# Patient Record
Sex: FEMALE | Race: BLACK OR AFRICAN AMERICAN
[De-identification: names, ages, dates, MRNs, and addresses within clinical notes are randomized per-mention and may not be internally consistent; named-entity substitution may affect disease eponyms.]

---

## 2021-07-13 ENCOUNTER — TRANSCRIPTION ENCOUNTER (OUTPATIENT)
Age: 31
End: 2021-07-13

## 2021-09-11 ENCOUNTER — TRANSCRIPTION ENCOUNTER (OUTPATIENT)
Age: 31
End: 2021-09-11

## 2023-05-23 PROBLEM — Z00.00 ENCOUNTER FOR PREVENTIVE HEALTH EXAMINATION: Status: ACTIVE | Noted: 2023-05-23

## 2023-11-27 ENCOUNTER — NON-APPOINTMENT (OUTPATIENT)
Age: 33
End: 2023-11-27

## 2024-01-03 ENCOUNTER — NON-APPOINTMENT (OUTPATIENT)
Age: 34
End: 2024-01-03

## 2024-04-23 ENCOUNTER — NON-APPOINTMENT (OUTPATIENT)
Age: 34
End: 2024-04-23

## 2024-06-21 ENCOUNTER — RESULT REVIEW (OUTPATIENT)
Age: 34
End: 2024-06-21

## 2024-06-21 ENCOUNTER — APPOINTMENT (OUTPATIENT)
Dept: MATERNAL FETAL MEDICINE | Facility: HOSPITAL | Age: 34
End: 2024-06-21
Payer: MEDICAID

## 2024-06-21 ENCOUNTER — ASOB RESULT (OUTPATIENT)
Age: 34
End: 2024-06-21

## 2024-06-21 ENCOUNTER — APPOINTMENT (OUTPATIENT)
Dept: OBGYN | Facility: HOSPITAL | Age: 34
End: 2024-06-21
Payer: MEDICAID

## 2024-06-21 ENCOUNTER — OUTPATIENT (OUTPATIENT)
Dept: OUTPATIENT SERVICES | Facility: HOSPITAL | Age: 34
LOS: 1 days | End: 2024-06-21

## 2024-06-21 VITALS
DIASTOLIC BLOOD PRESSURE: 78 MMHG | TEMPERATURE: 97.8 F | HEART RATE: 82 BPM | BODY MASS INDEX: 19.49 KG/M2 | HEIGHT: 63 IN | SYSTOLIC BLOOD PRESSURE: 128 MMHG | WEIGHT: 110 LBS

## 2024-06-21 DIAGNOSIS — Z34.82 ENCOUNTER FOR SUPERVISION OF OTHER NORMAL PREGNANCY, SECOND TRIMESTER: ICD-10-CM

## 2024-06-21 DIAGNOSIS — Z78.9 OTHER SPECIFIED HEALTH STATUS: ICD-10-CM

## 2024-06-21 DIAGNOSIS — Z82.49 FAMILY HISTORY OF ISCHEMIC HEART DISEASE AND OTHER DISEASES OF THE CIRCULATORY SYSTEM: ICD-10-CM

## 2024-06-21 DIAGNOSIS — Z80.1 FAMILY HISTORY OF MALIGNANT NEOPLASM OF TRACHEA, BRONCHUS AND LUNG: ICD-10-CM

## 2024-06-21 LAB
24R-OH-CALCIDIOL SERPL-MCNC: 35.9 NG/ML — SIGNIFICANT CHANGE UP (ref 30–80)
ALBUMIN SERPL ELPH-MCNC: 4.1 G/DL — SIGNIFICANT CHANGE UP (ref 3.3–5)
ALP SERPL-CCNC: 64 U/L — SIGNIFICANT CHANGE UP (ref 40–120)
ALT FLD-CCNC: 12 U/L — SIGNIFICANT CHANGE UP (ref 4–33)
ANION GAP SERPL CALC-SCNC: 12 MMOL/L — SIGNIFICANT CHANGE UP (ref 7–14)
APPEARANCE UR: CLEAR — SIGNIFICANT CHANGE UP
AST SERPL-CCNC: 20 U/L — SIGNIFICANT CHANGE UP (ref 4–32)
BACTERIA # UR AUTO: ABNORMAL /HPF
BILIRUB SERPL-MCNC: 0.7 MG/DL — SIGNIFICANT CHANGE UP (ref 0.2–1.2)
BILIRUB UR-MCNC: NEGATIVE — SIGNIFICANT CHANGE UP
BUN SERPL-MCNC: 6 MG/DL — LOW (ref 7–23)
CALCIUM SERPL-MCNC: 9.3 MG/DL — SIGNIFICANT CHANGE UP (ref 8.4–10.5)
CAST: 0 /LPF — SIGNIFICANT CHANGE UP (ref 0–4)
CHLORIDE SERPL-SCNC: 100 MMOL/L — SIGNIFICANT CHANGE UP (ref 98–107)
CO2 SERPL-SCNC: 23 MMOL/L — SIGNIFICANT CHANGE UP (ref 22–31)
COLOR SPEC: YELLOW — SIGNIFICANT CHANGE UP
CREAT SERPL-MCNC: 0.59 MG/DL — SIGNIFICANT CHANGE UP (ref 0.5–1.3)
DIFF PNL FLD: NEGATIVE — SIGNIFICANT CHANGE UP
EGFR: 123 ML/MIN/1.73M2 — SIGNIFICANT CHANGE UP
GLUCOSE SERPL-MCNC: 67 MG/DL — LOW (ref 70–99)
GLUCOSE UR QL: NEGATIVE MG/DL — SIGNIFICANT CHANGE UP
KETONES UR-MCNC: NEGATIVE MG/DL — SIGNIFICANT CHANGE UP
LEUKOCYTE ESTERASE UR-ACNC: ABNORMAL
NITRITE UR-MCNC: NEGATIVE — SIGNIFICANT CHANGE UP
PH UR: 6.5 — SIGNIFICANT CHANGE UP (ref 5–8)
POTASSIUM SERPL-MCNC: 4 MMOL/L — SIGNIFICANT CHANGE UP (ref 3.5–5.3)
POTASSIUM SERPL-SCNC: 4 MMOL/L — SIGNIFICANT CHANGE UP (ref 3.5–5.3)
PROT SERPL-MCNC: 7.3 G/DL — SIGNIFICANT CHANGE UP (ref 6–8.3)
PROT UR-MCNC: NEGATIVE MG/DL — SIGNIFICANT CHANGE UP
RBC CASTS # UR COMP ASSIST: 0 /HPF — SIGNIFICANT CHANGE UP (ref 0–4)
SODIUM SERPL-SCNC: 135 MMOL/L — SIGNIFICANT CHANGE UP (ref 135–145)
SP GR SPEC: 1.01 — SIGNIFICANT CHANGE UP (ref 1–1.03)
SQUAMOUS # UR AUTO: 10 /HPF — HIGH (ref 0–5)
URATE SERPL-MCNC: 2.7 MG/DL — SIGNIFICANT CHANGE UP (ref 2.5–7)
UROBILINOGEN FLD QL: 0.2 MG/DL — SIGNIFICANT CHANGE UP (ref 0.2–1)
WBC UR QL: 7 /HPF — HIGH (ref 0–5)

## 2024-06-21 PROCEDURE — 99385 PREV VISIT NEW AGE 18-39: CPT

## 2024-06-21 PROCEDURE — 76815 OB US LIMITED FETUS(S): CPT | Mod: 26

## 2024-06-21 RX ORDER — PRENATAL VIT 27,CALC/IRON/FA 60 MG-1 MG
60-1 TABLET ORAL DAILY
Qty: 100 | Refills: 2 | Status: ACTIVE | OUTPATIENT
Start: 2024-06-21 | End: 2025-04-17

## 2024-06-21 RX ORDER — ASPIRIN 81 MG/1
81 TABLET, COATED ORAL
Qty: 60 | Refills: 4 | Status: ACTIVE | COMMUNITY
Start: 2024-06-21 | End: 1900-01-01

## 2024-06-22 LAB
CULTURE RESULTS: SIGNIFICANT CHANGE UP
MEV IGG SER-ACNC: 135 AU/ML — SIGNIFICANT CHANGE UP
MEV IGG+IGM SER-IMP: POSITIVE — SIGNIFICANT CHANGE UP
SPECIMEN SOURCE: SIGNIFICANT CHANGE UP

## 2024-06-23 LAB
GAMMA INTERFERON BACKGROUND BLD IA-ACNC: 0.02 IU/ML — SIGNIFICANT CHANGE UP
M TB IFN-G BLD-IMP: NEGATIVE — SIGNIFICANT CHANGE UP
M TB IFN-G CD4+ BCKGRND COR BLD-ACNC: 0 IU/ML — SIGNIFICANT CHANGE UP
M TB IFN-G CD4+CD8+ BCKGRND COR BLD-ACNC: 0 IU/ML — SIGNIFICANT CHANGE UP
QUANT TB PLUS MITOGEN MINUS NIL: 0.88 IU/ML — SIGNIFICANT CHANGE UP

## 2024-06-24 DIAGNOSIS — Z34.82 ENCOUNTER FOR SUPERVISION OF OTHER NORMAL PREGNANCY, SECOND TRIMESTER: ICD-10-CM

## 2024-06-24 LAB
AF-AFP MOM: 1.47 — SIGNIFICANT CHANGE UP
AFP CONCENTRATION: 66.35 NG/ML — SIGNIFICANT CHANGE UP
AFP INTERPRETATION: SIGNIFICANT CHANGE UP
AFP MOM CUT-OFF: 2.8 — SIGNIFICANT CHANGE UP
AFP PERCENTILE: 88.3 — SIGNIFICANT CHANGE UP
AFP SCREENING RESULT: SIGNIFICANT CHANGE UP
AFTER SCREENING RISK OPEN SPINA BIFIDA: SIGNIFICANT CHANGE UP
BEFORE SCREENING RISK OPEN SPINA BIFIDA: SIGNIFICANT CHANGE UP
EXTREME ANALYTE ALERT: NO — SIGNIFICANT CHANGE UP
GEN TEST INFORMATION: SIGNIFICANT CHANGE UP
MS GESTATIONAL AGE: SIGNIFICANT CHANGE UP
ORDER ENTRY INFORMATION: SIGNIFICANT CHANGE UP

## 2024-06-27 ENCOUNTER — NON-APPOINTMENT (OUTPATIENT)
Age: 34
End: 2024-06-27

## 2024-07-05 ENCOUNTER — TRANSCRIPTION ENCOUNTER (OUTPATIENT)
Age: 34
End: 2024-07-05

## 2024-07-09 ENCOUNTER — NON-APPOINTMENT (OUTPATIENT)
Age: 34
End: 2024-07-09

## 2024-07-11 ENCOUNTER — ASOB RESULT (OUTPATIENT)
Age: 34
End: 2024-07-11

## 2024-07-11 ENCOUNTER — APPOINTMENT (OUTPATIENT)
Dept: OBGYN | Facility: HOSPITAL | Age: 34
End: 2024-07-11
Payer: MEDICAID

## 2024-07-11 ENCOUNTER — OUTPATIENT (OUTPATIENT)
Dept: OUTPATIENT SERVICES | Facility: HOSPITAL | Age: 34
LOS: 1 days | End: 2024-07-11

## 2024-07-11 ENCOUNTER — APPOINTMENT (OUTPATIENT)
Dept: MATERNAL FETAL MEDICINE | Facility: HOSPITAL | Age: 34
End: 2024-07-11
Payer: MEDICAID

## 2024-07-11 VITALS
HEART RATE: 86 BPM | BODY MASS INDEX: 21.97 KG/M2 | TEMPERATURE: 98 F | SYSTOLIC BLOOD PRESSURE: 125 MMHG | HEIGHT: 63 IN | WEIGHT: 124 LBS | DIASTOLIC BLOOD PRESSURE: 68 MMHG

## 2024-07-11 DIAGNOSIS — Z34.82 ENCOUNTER FOR SUPERVISION OF OTHER NORMAL PREGNANCY, SECOND TRIMESTER: ICD-10-CM

## 2024-07-11 PROCEDURE — 76811 OB US DETAILED SNGL FETUS: CPT | Mod: 26

## 2024-07-11 PROCEDURE — 99213 OFFICE O/P EST LOW 20 MIN: CPT

## 2024-07-12 DIAGNOSIS — Z34.82 ENCOUNTER FOR SUPERVISION OF OTHER NORMAL PREGNANCY, SECOND TRIMESTER: ICD-10-CM

## 2024-07-31 ENCOUNTER — NON-APPOINTMENT (OUTPATIENT)
Age: 34
End: 2024-07-31

## 2024-08-07 ENCOUNTER — NON-APPOINTMENT (OUTPATIENT)
Age: 34
End: 2024-08-07

## 2024-08-08 ENCOUNTER — OUTPATIENT (OUTPATIENT)
Dept: OUTPATIENT SERVICES | Facility: HOSPITAL | Age: 34
LOS: 1 days | End: 2024-08-08

## 2024-08-08 ENCOUNTER — APPOINTMENT (OUTPATIENT)
Dept: OBGYN | Facility: HOSPITAL | Age: 34
End: 2024-08-08

## 2024-08-08 PROCEDURE — 99213 OFFICE O/P EST LOW 20 MIN: CPT

## 2024-08-12 DIAGNOSIS — Z34.82 ENCOUNTER FOR SUPERVISION OF OTHER NORMAL PREGNANCY, SECOND TRIMESTER: ICD-10-CM

## 2024-08-14 ENCOUNTER — APPOINTMENT (OUTPATIENT)
Dept: MATERNAL FETAL MEDICINE | Facility: CLINIC | Age: 34
End: 2024-08-14
Payer: MEDICAID

## 2024-08-14 ENCOUNTER — ASOB RESULT (OUTPATIENT)
Age: 34
End: 2024-08-14

## 2024-08-14 PROCEDURE — 99204 OFFICE O/P NEW MOD 45 MIN: CPT | Mod: 95

## 2024-09-09 ENCOUNTER — NON-APPOINTMENT (OUTPATIENT)
Age: 34
End: 2024-09-09

## 2024-09-10 ENCOUNTER — NON-APPOINTMENT (OUTPATIENT)
Age: 34
End: 2024-09-10

## 2024-09-10 ENCOUNTER — RESULT REVIEW (OUTPATIENT)
Age: 34
End: 2024-09-10

## 2024-09-10 ENCOUNTER — APPOINTMENT (OUTPATIENT)
Dept: OBGYN | Facility: HOSPITAL | Age: 34
End: 2024-09-10
Payer: MEDICAID

## 2024-09-10 ENCOUNTER — OUTPATIENT (OUTPATIENT)
Dept: OUTPATIENT SERVICES | Facility: HOSPITAL | Age: 34
LOS: 1 days | End: 2024-09-10

## 2024-09-10 VITALS
BODY MASS INDEX: 25.16 KG/M2 | TEMPERATURE: 98 F | SYSTOLIC BLOOD PRESSURE: 125 MMHG | HEIGHT: 63 IN | WEIGHT: 142 LBS | DIASTOLIC BLOOD PRESSURE: 80 MMHG | HEART RATE: 78 BPM

## 2024-09-10 DIAGNOSIS — Z34.82 ENCOUNTER FOR SUPERVISION OF OTHER NORMAL PREGNANCY, SECOND TRIMESTER: ICD-10-CM

## 2024-09-10 DIAGNOSIS — Z34.93 ENCOUNTER FOR SUPERVISION OF NORMAL PREGNANCY, UNSPECIFIED, THIRD TRIMESTER: ICD-10-CM

## 2024-09-10 LAB
ANISOCYTOSIS BLD QL: SLIGHT — SIGNIFICANT CHANGE UP
BASOPHILS # BLD AUTO: 0 K/UL — SIGNIFICANT CHANGE UP (ref 0–0.2)
BASOPHILS NFR BLD AUTO: 0 % — SIGNIFICANT CHANGE UP (ref 0–2)
EOSINOPHIL # BLD AUTO: 0.24 K/UL — SIGNIFICANT CHANGE UP (ref 0–0.5)
EOSINOPHIL NFR BLD AUTO: 2.6 % — SIGNIFICANT CHANGE UP (ref 0–6)
GIANT PLATELETS BLD QL SMEAR: PRESENT — SIGNIFICANT CHANGE UP
GLUCOSE 1H P MEAL SERPL-MCNC: 93 MG/DL — SIGNIFICANT CHANGE UP (ref 70–134)
HCT VFR BLD CALC: 33.3 % — LOW (ref 34.5–45)
HGB BLD-MCNC: 11.1 G/DL — LOW (ref 11.5–15.5)
HIV 1+2 AB+HIV1 P24 AG SERPL QL IA: SIGNIFICANT CHANGE UP
HYPOCHROMIA BLD QL: SLIGHT — SIGNIFICANT CHANGE UP
IANC: 6.02 K/UL — SIGNIFICANT CHANGE UP (ref 1.8–7.4)
LYMPHOCYTES # BLD AUTO: 1.27 K/UL — SIGNIFICANT CHANGE UP (ref 1–3.3)
LYMPHOCYTES # BLD AUTO: 13.8 % — SIGNIFICANT CHANGE UP (ref 13–44)
MCHC RBC-ENTMCNC: 28.8 PG — SIGNIFICANT CHANGE UP (ref 27–34)
MCHC RBC-ENTMCNC: 33.3 GM/DL — SIGNIFICANT CHANGE UP (ref 32–36)
MCV RBC AUTO: 86.3 FL — SIGNIFICANT CHANGE UP (ref 80–100)
METAMYELOCYTES # FLD: 0.9 % — SIGNIFICANT CHANGE UP (ref 0–1)
MONOCYTES # BLD AUTO: 0.48 K/UL — SIGNIFICANT CHANGE UP (ref 0–0.9)
MONOCYTES NFR BLD AUTO: 5.2 % — SIGNIFICANT CHANGE UP (ref 2–14)
MYELOCYTES NFR BLD: 1.7 % — HIGH (ref 0–0)
NEUTROPHILS # BLD AUTO: 6.82 K/UL — SIGNIFICANT CHANGE UP (ref 1.8–7.4)
NEUTROPHILS NFR BLD AUTO: 73.3 % — SIGNIFICANT CHANGE UP (ref 43–77)
NEUTS BAND # BLD: 0.8 % — SIGNIFICANT CHANGE UP (ref 0–6)
OVALOCYTES BLD QL SMEAR: SLIGHT — SIGNIFICANT CHANGE UP
PLAT MORPH BLD: NORMAL — SIGNIFICANT CHANGE UP
PLATELET # BLD AUTO: 253 K/UL — SIGNIFICANT CHANGE UP (ref 150–400)
PLATELET COUNT - ESTIMATE: NORMAL — SIGNIFICANT CHANGE UP
POIKILOCYTOSIS BLD QL AUTO: SLIGHT — SIGNIFICANT CHANGE UP
POLYCHROMASIA BLD QL SMEAR: SLIGHT — SIGNIFICANT CHANGE UP
RBC # BLD: 3.86 M/UL — SIGNIFICANT CHANGE UP (ref 3.8–5.2)
RBC # FLD: 12.9 % — SIGNIFICANT CHANGE UP (ref 10.3–14.5)
RBC BLD AUTO: ABNORMAL
VARIANT LYMPHS # BLD: 1.7 % — SIGNIFICANT CHANGE UP (ref 0–6)
WBC # BLD: 9.2 K/UL — SIGNIFICANT CHANGE UP (ref 3.8–10.5)
WBC # FLD AUTO: 9.2 K/UL — SIGNIFICANT CHANGE UP (ref 3.8–10.5)

## 2024-09-10 PROCEDURE — 99213 OFFICE O/P EST LOW 20 MIN: CPT

## 2024-09-10 RX ORDER — ASPIRIN 81 MG/1
81 TABLET, COATED ORAL DAILY
Qty: 90 | Refills: 3 | Status: DISCONTINUED | COMMUNITY
Start: 2024-09-10 | End: 2024-09-10

## 2024-09-10 RX ORDER — ASPIRIN 81 MG/1
81 TABLET, COATED ORAL DAILY
Qty: 60 | Refills: 2 | Status: ACTIVE | COMMUNITY
Start: 2024-09-10 | End: 1900-01-01

## 2024-09-11 DIAGNOSIS — Z34.93 ENCOUNTER FOR SUPERVISION OF NORMAL PREGNANCY, UNSPECIFIED, THIRD TRIMESTER: ICD-10-CM

## 2024-09-11 DIAGNOSIS — D56.3 THALASSEMIA MINOR: ICD-10-CM

## 2024-09-11 LAB
24R-OH-CALCIDIOL SERPL-MCNC: 29.3 NG/ML — LOW (ref 30–80)
T PALLIDUM AB TITR SER: NEGATIVE — SIGNIFICANT CHANGE UP

## 2024-09-12 LAB — LEAD BLD-MCNC: <1 UG/DL — SIGNIFICANT CHANGE UP (ref 0–3.4)

## 2024-09-23 ENCOUNTER — NON-APPOINTMENT (OUTPATIENT)
Age: 34
End: 2024-09-23

## 2024-09-24 ENCOUNTER — ASOB RESULT (OUTPATIENT)
Age: 34
End: 2024-09-24

## 2024-09-24 ENCOUNTER — APPOINTMENT (OUTPATIENT)
Dept: ANTEPARTUM | Facility: CLINIC | Age: 34
End: 2024-09-24
Payer: MEDICAID

## 2024-09-24 ENCOUNTER — APPOINTMENT (OUTPATIENT)
Dept: OBGYN | Facility: HOSPITAL | Age: 34
End: 2024-09-24
Payer: MEDICAID

## 2024-09-24 ENCOUNTER — OUTPATIENT (OUTPATIENT)
Dept: OUTPATIENT SERVICES | Facility: HOSPITAL | Age: 34
LOS: 1 days | End: 2024-09-24

## 2024-09-24 VITALS
WEIGHT: 146 LBS | HEIGHT: 63 IN | TEMPERATURE: 97.6 F | DIASTOLIC BLOOD PRESSURE: 76 MMHG | BODY MASS INDEX: 25.87 KG/M2 | SYSTOLIC BLOOD PRESSURE: 110 MMHG | HEART RATE: 77 BPM

## 2024-09-24 VITALS
HEART RATE: 77 BPM | SYSTOLIC BLOOD PRESSURE: 127 MMHG | DIASTOLIC BLOOD PRESSURE: 90 MMHG | BODY MASS INDEX: 25.87 KG/M2 | TEMPERATURE: 97.6 F | WEIGHT: 146 LBS | HEIGHT: 63 IN

## 2024-09-24 DIAGNOSIS — Z34.93 ENCOUNTER FOR SUPERVISION OF NORMAL PREGNANCY, UNSPECIFIED, THIRD TRIMESTER: ICD-10-CM

## 2024-09-24 DIAGNOSIS — E55.9 VITAMIN D DEFICIENCY, UNSPECIFIED: ICD-10-CM

## 2024-09-24 PROCEDURE — 76816 OB US FOLLOW-UP PER FETUS: CPT

## 2024-09-24 PROCEDURE — 99213 OFFICE O/P EST LOW 20 MIN: CPT

## 2024-09-24 PROCEDURE — 76819 FETAL BIOPHYS PROFIL W/O NST: CPT

## 2024-09-24 RX ORDER — CHROMIUM 200 MCG
25 MCG TABLET ORAL DAILY
Qty: 90 | Refills: 1 | Status: ACTIVE | COMMUNITY
Start: 2024-09-24 | End: 1900-01-01

## 2024-09-25 DIAGNOSIS — E55.9 VITAMIN D DEFICIENCY, UNSPECIFIED: ICD-10-CM

## 2024-10-07 ENCOUNTER — NON-APPOINTMENT (OUTPATIENT)
Age: 34
End: 2024-10-07

## 2024-10-08 ENCOUNTER — OUTPATIENT (OUTPATIENT)
Dept: OUTPATIENT SERVICES | Facility: HOSPITAL | Age: 34
LOS: 1 days | End: 2024-10-08

## 2024-10-08 ENCOUNTER — APPOINTMENT (OUTPATIENT)
Dept: OBGYN | Facility: HOSPITAL | Age: 34
End: 2024-10-08
Payer: MEDICAID

## 2024-10-08 VITALS
TEMPERATURE: 97.8 F | WEIGHT: 152 LBS | SYSTOLIC BLOOD PRESSURE: 127 MMHG | DIASTOLIC BLOOD PRESSURE: 85 MMHG | HEART RATE: 82 BPM | HEIGHT: 63 IN | BODY MASS INDEX: 26.93 KG/M2

## 2024-10-08 DIAGNOSIS — Z34.93 ENCOUNTER FOR SUPERVISION OF NORMAL PREGNANCY, UNSPECIFIED, THIRD TRIMESTER: ICD-10-CM

## 2024-10-08 PROCEDURE — 99213 OFFICE O/P EST LOW 20 MIN: CPT

## 2024-10-10 DIAGNOSIS — Z34.93 ENCOUNTER FOR SUPERVISION OF NORMAL PREGNANCY, UNSPECIFIED, THIRD TRIMESTER: ICD-10-CM

## 2024-10-22 ENCOUNTER — APPOINTMENT (OUTPATIENT)
Dept: ANTEPARTUM | Facility: CLINIC | Age: 34
End: 2024-10-22

## 2024-10-24 ENCOUNTER — APPOINTMENT (OUTPATIENT)
Dept: OBGYN | Facility: HOSPITAL | Age: 34
End: 2024-10-24
Payer: MEDICAID

## 2024-10-24 ENCOUNTER — APPOINTMENT (OUTPATIENT)
Dept: MATERNAL FETAL MEDICINE | Facility: HOSPITAL | Age: 34
End: 2024-10-24
Payer: MEDICAID

## 2024-10-24 ENCOUNTER — ASOB RESULT (OUTPATIENT)
Age: 34
End: 2024-10-24

## 2024-10-24 ENCOUNTER — OUTPATIENT (OUTPATIENT)
Dept: OUTPATIENT SERVICES | Facility: HOSPITAL | Age: 34
LOS: 1 days | End: 2024-10-24

## 2024-10-24 VITALS
BODY MASS INDEX: 28.53 KG/M2 | DIASTOLIC BLOOD PRESSURE: 82 MMHG | TEMPERATURE: 98.1 F | SYSTOLIC BLOOD PRESSURE: 128 MMHG | HEART RATE: 90 BPM | HEIGHT: 63 IN | WEIGHT: 161 LBS

## 2024-10-24 DIAGNOSIS — M25.511 PAIN IN RIGHT SHOULDER: ICD-10-CM

## 2024-10-24 DIAGNOSIS — Z34.93 ENCOUNTER FOR SUPERVISION OF NORMAL PREGNANCY, UNSPECIFIED, THIRD TRIMESTER: ICD-10-CM

## 2024-10-24 PROCEDURE — 99213 OFFICE O/P EST LOW 20 MIN: CPT

## 2024-10-24 PROCEDURE — 76816 OB US FOLLOW-UP PER FETUS: CPT | Mod: 26

## 2024-10-24 PROCEDURE — 76819 FETAL BIOPHYS PROFIL W/O NST: CPT | Mod: 26,59

## 2024-10-25 DIAGNOSIS — Z34.93 ENCOUNTER FOR SUPERVISION OF NORMAL PREGNANCY, UNSPECIFIED, THIRD TRIMESTER: ICD-10-CM

## 2024-10-30 ENCOUNTER — ASOB RESULT (OUTPATIENT)
Age: 34
End: 2024-10-30

## 2024-10-30 ENCOUNTER — APPOINTMENT (OUTPATIENT)
Dept: ANTEPARTUM | Facility: CLINIC | Age: 34
End: 2024-10-30

## 2024-10-30 ENCOUNTER — INPATIENT (INPATIENT)
Facility: HOSPITAL | Age: 34
LOS: 3 days | Discharge: HOME | End: 2024-11-03
Attending: SPECIALIST | Admitting: SPECIALIST
Payer: MEDICAID

## 2024-10-30 ENCOUNTER — NON-APPOINTMENT (OUTPATIENT)
Age: 34
End: 2024-10-30

## 2024-10-30 VITALS
TEMPERATURE: 98 F | RESPIRATION RATE: 16 BRPM | DIASTOLIC BLOOD PRESSURE: 92 MMHG | SYSTOLIC BLOOD PRESSURE: 148 MMHG | HEART RATE: 83 BPM

## 2024-10-30 DIAGNOSIS — O14.93 UNSPECIFIED PRE-ECLAMPSIA, THIRD TRIMESTER: ICD-10-CM

## 2024-10-30 DIAGNOSIS — O26.899 OTHER SPECIFIED PREGNANCY RELATED CONDITIONS, UNSPECIFIED TRIMESTER: ICD-10-CM

## 2024-10-30 LAB
ALBUMIN SERPL ELPH-MCNC: 3.4 G/DL — SIGNIFICANT CHANGE UP (ref 3.3–5)
ALP SERPL-CCNC: 121 U/L — HIGH (ref 40–120)
ALT FLD-CCNC: 19 U/L — SIGNIFICANT CHANGE UP (ref 4–33)
ANION GAP SERPL CALC-SCNC: 9 MMOL/L — SIGNIFICANT CHANGE UP (ref 7–14)
APPEARANCE UR: ABNORMAL
AST SERPL-CCNC: 23 U/L — SIGNIFICANT CHANGE UP (ref 4–32)
BACTERIA # UR AUTO: NEGATIVE /HPF — SIGNIFICANT CHANGE UP
BASOPHILS # BLD AUTO: 0.09 K/UL — SIGNIFICANT CHANGE UP (ref 0–0.2)
BASOPHILS NFR BLD AUTO: 1 % — SIGNIFICANT CHANGE UP (ref 0–2)
BILIRUB SERPL-MCNC: 0.4 MG/DL — SIGNIFICANT CHANGE UP (ref 0.2–1.2)
BILIRUB UR-MCNC: NEGATIVE — SIGNIFICANT CHANGE UP
BUN SERPL-MCNC: 4 MG/DL — LOW (ref 7–23)
CALCIUM SERPL-MCNC: 8.8 MG/DL — SIGNIFICANT CHANGE UP (ref 8.4–10.5)
CAST: 0 /LPF — SIGNIFICANT CHANGE UP (ref 0–4)
CHLORIDE SERPL-SCNC: 106 MMOL/L — SIGNIFICANT CHANGE UP (ref 98–107)
CO2 SERPL-SCNC: 22 MMOL/L — SIGNIFICANT CHANGE UP (ref 22–31)
COLOR SPEC: YELLOW — SIGNIFICANT CHANGE UP
CREAT ?TM UR-MCNC: 70 MG/DL — SIGNIFICANT CHANGE UP
CREAT SERPL-MCNC: 0.67 MG/DL — SIGNIFICANT CHANGE UP (ref 0.5–1.3)
DIFF PNL FLD: NEGATIVE — SIGNIFICANT CHANGE UP
EGFR: 120 ML/MIN/1.73M2 — SIGNIFICANT CHANGE UP
EOSINOPHIL # BLD AUTO: 0.2 K/UL — SIGNIFICANT CHANGE UP (ref 0–0.5)
EOSINOPHIL NFR BLD AUTO: 2.3 % — SIGNIFICANT CHANGE UP (ref 0–6)
GLUCOSE SERPL-MCNC: 79 MG/DL — SIGNIFICANT CHANGE UP (ref 70–99)
GLUCOSE UR QL: NEGATIVE MG/DL — SIGNIFICANT CHANGE UP
HCT VFR BLD CALC: 35.9 % — SIGNIFICANT CHANGE UP (ref 34.5–45)
HGB BLD-MCNC: 11.8 G/DL — SIGNIFICANT CHANGE UP (ref 11.5–15.5)
IANC: 5.63 K/UL — SIGNIFICANT CHANGE UP (ref 1.8–7.4)
IMM GRANULOCYTES NFR BLD AUTO: 4.1 % — HIGH (ref 0–0.9)
KETONES UR-MCNC: NEGATIVE MG/DL — SIGNIFICANT CHANGE UP
LDH SERPL L TO P-CCNC: 229 U/L — HIGH (ref 135–225)
LEUKOCYTE ESTERASE UR-ACNC: ABNORMAL
LYMPHOCYTES # BLD AUTO: 1.73 K/UL — SIGNIFICANT CHANGE UP (ref 1–3.3)
LYMPHOCYTES # BLD AUTO: 19.6 % — SIGNIFICANT CHANGE UP (ref 13–44)
MCHC RBC-ENTMCNC: 28.5 PG — SIGNIFICANT CHANGE UP (ref 27–34)
MCHC RBC-ENTMCNC: 32.9 G/DL — SIGNIFICANT CHANGE UP (ref 32–36)
MCV RBC AUTO: 86.7 FL — SIGNIFICANT CHANGE UP (ref 80–100)
MONOCYTES # BLD AUTO: 0.8 K/UL — SIGNIFICANT CHANGE UP (ref 0–0.9)
MONOCYTES NFR BLD AUTO: 9.1 % — SIGNIFICANT CHANGE UP (ref 2–14)
NEUTROPHILS # BLD AUTO: 5.63 K/UL — SIGNIFICANT CHANGE UP (ref 1.8–7.4)
NEUTROPHILS NFR BLD AUTO: 63.9 % — SIGNIFICANT CHANGE UP (ref 43–77)
NITRITE UR-MCNC: NEGATIVE — SIGNIFICANT CHANGE UP
NRBC # BLD: 0 /100 WBCS — SIGNIFICANT CHANGE UP (ref 0–0)
NRBC # FLD: 0.04 K/UL — HIGH (ref 0–0)
PH UR: 6.5 — SIGNIFICANT CHANGE UP (ref 5–8)
PLATELET # BLD AUTO: 225 K/UL — SIGNIFICANT CHANGE UP (ref 150–400)
POTASSIUM SERPL-MCNC: 4 MMOL/L — SIGNIFICANT CHANGE UP (ref 3.5–5.3)
POTASSIUM SERPL-SCNC: 4 MMOL/L — SIGNIFICANT CHANGE UP (ref 3.5–5.3)
PROT ?TM UR-MCNC: 26 MG/DL — SIGNIFICANT CHANGE UP
PROT SERPL-MCNC: 6.5 G/DL — SIGNIFICANT CHANGE UP (ref 6–8.3)
PROT UR-MCNC: 30 MG/DL
PROT/CREAT UR-RTO: 0.4 RATIO — HIGH (ref 0–0.2)
RBC # BLD: 4.14 M/UL — SIGNIFICANT CHANGE UP (ref 3.8–5.2)
RBC # FLD: 13.9 % — SIGNIFICANT CHANGE UP (ref 10.3–14.5)
RBC CASTS # UR COMP ASSIST: 0 /HPF — SIGNIFICANT CHANGE UP (ref 0–4)
REVIEW: SIGNIFICANT CHANGE UP
SODIUM SERPL-SCNC: 137 MMOL/L — SIGNIFICANT CHANGE UP (ref 135–145)
SP GR SPEC: 1.01 — SIGNIFICANT CHANGE UP (ref 1–1.03)
SQUAMOUS # UR AUTO: 5 /HPF — SIGNIFICANT CHANGE UP (ref 0–5)
URATE SERPL-MCNC: 4.1 MG/DL — SIGNIFICANT CHANGE UP (ref 2.5–7)
UROBILINOGEN FLD QL: 0.2 MG/DL — SIGNIFICANT CHANGE UP (ref 0.2–1)
WBC # BLD: 8.81 K/UL — SIGNIFICANT CHANGE UP (ref 3.8–10.5)
WBC # FLD AUTO: 8.81 K/UL — SIGNIFICANT CHANGE UP (ref 3.8–10.5)
WBC UR QL: 2 /HPF — SIGNIFICANT CHANGE UP (ref 0–5)

## 2024-10-30 PROCEDURE — 76819 FETAL BIOPHYS PROFIL W/O NST: CPT | Mod: 26

## 2024-10-30 RX ORDER — CHLORHEXIDINE GLUCONATE 40 MG/ML
1 SOLUTION TOPICAL DAILY
Refills: 0 | Status: DISCONTINUED | OUTPATIENT
Start: 2024-10-30 | End: 2024-10-30

## 2024-10-30 RX ORDER — SODIUM CHLORIDE 9 MG/ML
3 INJECTION, SOLUTION INTRAMUSCULAR; INTRAVENOUS; SUBCUTANEOUS EVERY 8 HOURS
Refills: 0 | Status: DISCONTINUED | OUTPATIENT
Start: 2024-10-30 | End: 2024-11-03

## 2024-10-30 RX ORDER — OXYTOCIN IN D5W-0.2% SODIUM CL 15/250 ML
PLASTIC BAG, INJECTION (ML) INTRAVENOUS
Qty: 30 | Refills: 0 | Status: DISCONTINUED | OUTPATIENT
Start: 2024-10-30 | End: 2024-10-30

## 2024-10-30 RX ORDER — NIFEDIPINE 90 MG
30 TABLET, EXTENDED RELEASE 24 HR ORAL DAILY
Refills: 0 | Status: DISCONTINUED | OUTPATIENT
Start: 2024-10-30 | End: 2024-10-31

## 2024-10-30 RX ORDER — INFLUENZ VIR VAC TV P-SURF2003 15MCG/.5ML
0.5 SYRINGE (ML) INTRAMUSCULAR ONCE
Refills: 0 | Status: COMPLETED | OUTPATIENT
Start: 2024-10-30 | End: 2024-10-30

## 2024-10-30 RX ADMIN — Medication 30 MILLIGRAM(S): at 20:43

## 2024-10-30 RX ADMIN — SODIUM CHLORIDE 3 MILLILITER(S): 9 INJECTION, SOLUTION INTRAMUSCULAR; INTRAVENOUS; SUBCUTANEOUS at 22:09

## 2024-10-30 NOTE — OB PROVIDER TRIAGE NOTE - NSOBPROVIDERNOTE_OBGYN_ALL_OB_FT
NST in progress  HELLP labs sent   saline lock in place  for bpp  will continue to monitor NST in progress  HEL labs sent   saline lock in place  for bpp  will continue to monitor    ___________________________________________________________________  GISELE Granadoser NP  1900  Received care of patient   [facility] labs pending   Serial BPs     20:00  [facility] labs- WNL  PC 0.4  148-92, 155/100, 143/97, 136/89, 141/90, 137/87, 133/88  Admit for observation, PEC without severe features

## 2024-10-30 NOTE — OB PROVIDER H&P - NSLOWPPHRISK_OBGYN_A_OB
No previous uterine incision/Aly Pregnancy/Less than or equal to 4 previous vaginal births/No known bleeding disorder/No history of postpartum hemorrhage/No other PPH risks indicated

## 2024-10-30 NOTE — OB PROVIDER H&P - PROBLEM SELECTOR PLAN 1
Admit for PEC without severe features, observation   D/W Dr. Sweet   Routine Orders  Observation of BPs  Procardia XL30mg daily  GBS collected  HELLP labs WNL  HELLP labs AM  24 hour urine collection  NST BID  MFM AM    Risks, benefits, alternatives, and possible complications have been discussed in detail with the patient in her native language. Pre-admission, admission, and post admission procedures and expectations were discussed in detail. All questions answered, all appropriate hospital consents were signed. Anticipate normal vaginal delivery.   Informed consent was obtained. The following was discussed:   - Induction/augmentation of labor: use of medication and/or cook balloon to begin or enhance labor   - Obstetrical management including internal fetal/contraction monitoring   - Normal vaginal delivery   - Possible  section

## 2024-10-30 NOTE — OB PROVIDER H&P - ASSESSMENT
NST in progress  [facility] labs sent   saline lock in place  for bpp  will continue to monitor    ___________________________________________________________________  GISELE Granadoser NP  1900  Received care of patient   [facility] labs pending   Serial BPs     20:00  D/W Dr. Kee mckinley- WNL  PC 0.4  148-92, 155/100, 143/97, 136/89, 141/90, 137/87, 133/88  Admit for observation, PEC without severe features   NST in progress  HELLP labs sent   saline lock in place  for bpp  will continue to monitor    ___________________________________________________________________  GISELE Madden NP  1900  Received care of patient   HELLP labs pending   Serial BPs     20:00  D/W Dr. Sweet   HELLP labs- WNL  PC 0.4  148-92, 155/100, 143/97, 136/89, 141/90, 137/87, 133/88  Admit for observation, PEC without severe features    OB Attending On Call  33 year old  EGA 35+ weeks with increased BPs.  P/C 0.4  FHT - reactive  Pt with preeclampsia without severe features.    Will:    1) Admit to L and D  2) 24 urine  3) Serial labs  4) Ofiicial sono  5) MFM consult    Will follow    ZINA Sweet

## 2024-10-30 NOTE — OB PROVIDER TRIAGE NOTE - NSHPPHYSICALEXAM_GEN_ALL_CORE
Vital Signs Last 24 Hrs  T(C): 36.4 (30 Oct 2024 18:49), Max: 36.4 (30 Oct 2024 18:19)  T(F): 97.52 (30 Oct 2024 18:49), Max: 97.52 (30 Oct 2024 18:49)  HR: 82 (30 Oct 2024 18:49) (82 - 83)  BP: 155/100 (30 Oct 2024 18:49) (148/92 - 155/100)  BP(mean): --  RR: 16 (30 Oct 2024 18:49) (16 - 16)  SpO2: --    Assessment reveals VSS  General: Female sitting comfortably in no apparent distress  Neuro: No facial asymmetry, no slurred speech, moves all 4 extremities  Mood: Alert and lucid, appropriate mood and affect  A&Ox3  Lungs- clear bilateral  Heart- normal rate and regular rhythm  Extremities- Warm, Dry, no edema present, good pulses   Abdomen soft, NT, gravid  Cat 1 tracing reactive, no contractions on toco   Transabdominal Ultrasound-                 images saved ASOB  Vaginal Exam-

## 2024-10-30 NOTE — OB PROVIDER H&P - NS_OBGYNHISTORY_OBGYN_ALL_OB_FT
OB: TOPx2  D&Cx1  GYN: Denies      AP course uncomplicated  #PEC without severe features   #HELLP- WNL. PC 0.4

## 2024-10-30 NOTE — OB RN TRIAGE NOTE - TEMPERATURE IN FAHRENHEIT (DEGREES F)
Patient is calling asking that her eye glass prescription be faxed to [facility] in [place] at [phone]    Callback: [phone]   97.5

## 2024-10-30 NOTE — OB PROVIDER H&P - HISTORY OF PRESENT ILLNESS
33 year old  at 35.1 presents to triage after having a visiting nurse appointment ([facility]) and found to have a bp of 135/105. Patient denies any history of elevated bp, denies headaches, blurry vision, epigastric pain, sob. Denies contractions, leaking of fluid, vaginal bleeding, reports good fetal movement. Denies ob complications.   Hx: Asthma- allergy induced, denies hospitalizations and intubations, herniated discs that cause numbness to her right hand and elbow(referred to ortho as per clinic note), scoliosis.     PNC: [facility]

## 2024-10-30 NOTE — OB PROVIDER H&P - NSHPPHYSICALEXAM_GEN_ALL_CORE
Vital Signs Last 24 Hrs  T(C): 36.4 (30 Oct 2024 18:49), Max: 36.4 (30 Oct 2024 18:19)  T(F): 97.52 (30 Oct 2024 18:49), Max: 97.52 (30 Oct 2024 18:49)  HR: 75 (30 Oct 2024 20:49) (72 - 98)  BP: 151/90 (30 Oct 2024 20:49) (133/88 - 155/100)  RR: 16 (30 Oct 2024 18:49) (16 - 16)      Assessment reveals VSS  General: Female sitting comfortably in no apparent distress  Neuro: No facial asymmetry, no slurred speech, moves all 4 extremities  Mood: Alert and lucid, appropriate mood and affect  A&Ox3  Lungs- clear bilateral  Heart- normal rate and regular rhythm  Extremities- Warm, Dry, no edema present, good pulses   Abdomen soft, NT, gravid  Cat 1 tracing reactive, no contractions on toco   Transabdominal Ultrasound-  vtx, ant placenta, bpp8/8, ricardo:  10.56 images saved ASOB

## 2024-10-30 NOTE — OB PROVIDER TRIAGE NOTE - HISTORY OF PRESENT ILLNESS
33 year old  at 35.1 presents to triage after having a visiting nurse appointment and found to have a bp of 135/105. Patient denies any history of elevated bp, denies headaches, blurry vision, epigastric pain, sob. Denies contractions, leaking of fluid, vaginal bleeding, reports good fetal movement. Denies ob complications.   Hx: Asthma- allergy induced, denies hospitalizations and intubations, herniated discs that cause numbness to her right hand and elbow, scoliosis.     PNC: [facility]  33 year old  at 35.1 presents to triage after having a visiting nurse appointment and found to have a bp of 135/105. Patient denies any history of elevated bp, denies headaches, blurry vision, epigastric pain, sob. Denies contractions, leaking of fluid, vaginal bleeding, reports good fetal movement. Denies ob complications.   Hx: Asthma- allergy induced, denies hospitalizations and intubations, herniated discs that cause numbness to her right hand and elbow(referred to ortho as per clinic note), scoliosis.     PNC: [facility]

## 2024-10-30 NOTE — OB RN PATIENT PROFILE - BLOOD TRANSFUSION, PREVIOUS, PROFILE
Telephone Encounter by Sue Tabor at 01/17/17 11:26 AM     Author:  Sue Tabor Service:  (none) Author Type:  Patient      Filed:  01/17/17 11:27 AM Encounter Date:  1/17/2017 Status:  Signed     :  Sue Tabor (Patient )            Patient dad Ramu called to verify if JONAS has been faxed . Patient dad will call back to confirm fax number[[email or web address]]       Revision History        User Key Date/Time User Provider Type Action    > AR1.1 01/17/17 11:27 AM Sue Tabor Patient  Sign    M - Manual             no

## 2024-10-30 NOTE — OB RN TRIAGE NOTE - NS_FETALMOVEMENT_OBGYN_ALL_OB
Consult Note: Hematology/Oncology    Date of consultation: 12/29/2023 10:10 AM    Referring provider: Dr Paulino    Reason for consultation: Probable gastric cancer with mets to the liver, GI bleed, borderline performance status      History of presenting illness:     85-year-old woman with a PMH of PE on apixaban, asthma, history of breast cancer, hypertension, hyperlipidemia, hypothyroidism, meningioma presented for from [facility] with melena, hemoglobin 7.3 she underwent for a CT scan which showed metastatic lesions in the liver along with a mass in the gastric greater curvature.    On 12/28/2023 underwent for an EGD which showed a large fungating mass suspicious for gastric cancers, biopsy taken, awaiting results.  She was found to have an oozing mass with bleeding doing biopsy.  As she was found to have a worsening hemoglobin during hospital stay,  She got blood transfusions, her hemoglobin is now overall stable.    Past Medical History:    Past Medical History:   Diagnosis Date    Asthma     Hypertension        Past surgical history:    Past Surgical History:   Procedure Laterality Date    WA UPPER GI ENDOSCOPY,DIAGNOSIS N/A 12/28/2023    Procedure: GASTROSCOPY;  Surgeon: Alex Alvarez M.D.;  Location: SURGERY SAME DAY [facility];  Service: Gastroenterology    WA UPPER GI ENDOSCOPY,BIOPSY N/A 12/28/2023    Procedure: GASTROSCOPY, WITH BIOPSY;  Surgeon: Alex Alvarez M.D.;  Location: SURGERY SAME DAY [facility];  Service: Gastroenterology    APPENDECTOMY      HYSTERECTOMY RADICAL      THYROIDECTOMY         Allergies:  Codeine    Medications:    Current Facility-Administered Medications   Medication Dose Route Frequency Provider Last Rate Last Admin    MD ALERT...adult comfort care   Other PRN Abdelrahman Jaime M.D.        atropine 1 % ophthalmic solution 2 Drop  2 Drop Sublingual Q4HRS PRN Abdelrahman Jaime M.D.        HYDROmorphone (Dilaudid) injection 1 mg  1 mg Intravenous Q3HRS PRN Abdelrahman Jaime M.D.         senna-docusate (Pericolace Or Senokot S) 8.6-50 MG per tablet 2 Tablet  2 Tablet Oral BID Andres Constantino M.D.        And    polyethylene glycol/lytes (Miralax) Packet 1 Packet  1 Packet Oral QDAY PRN Andres Constantino M.D.        And    magnesium hydroxide (Milk Of Magnesia) suspension 30 mL  30 mL Oral QDAY PRN Andres Constantino M.D.        And    bisacodyl (Dulcolax) suppository 10 mg  10 mg Rectal QDAY PRN Andres Constantino M.D.        acetaminophen (Tylenol) tablet 650 mg  650 mg Oral Q6HRS PRN Andres Constantino M.D.        oxyCODONE immediate-release (Roxicodone) tablet 2.5 mg  2.5 mg Oral Q3HRS PRN Andres Constantino M.D.   2.5 mg at 23 1805    Or    oxyCODONE immediate-release (Roxicodone) tablet 5 mg  5 mg Oral Q3HRS PRN Andres Constantino M.D.   5 mg at 23 0101    Or    morphine 4 MG/ML injection 2 mg  2 mg Intravenous Q3HRS PRN Andres Constantino M.D.   2 mg at 23    ondansetron (Zofran) syringe/vial injection 4 mg  4 mg Intravenous Q4HRS PRN Andres Constantino M.D.   4 mg at 23 2249    ondansetron (Zofran ODT) dispertab 4 mg  4 mg Oral Q4HRS PRN Andres Constantino M.D.        budesonide (Pulmicort) neb susp 0.25 mg  0.25 mg Nebulization BID Andres Constantino M.D.   0.25 mg at 23 0746    Respiratory Therapy Consult   Nebulization Continuous RT Franky Monroe D.O.        albuterol (Proventil) 2.5mg/0.5ml nebulizer solution 2.5 mg  2.5 mg Nebulization Q2HRS PRN (RT) Franky Bunuel-Yoana, D.O.           Social History:     Social History     Socioeconomic History    Marital status:      Spouse name: Not on file    Number of children: Not on file    Years of education: Not on file    Highest education level: Not on file   Occupational History    Not on file   Tobacco Use    Smoking status: Former     Current packs/day: 0.00     Types: Cigarettes     Quit date:      Years since quittin.0    Smokeless tobacco: Never   Substance and Sexual Activity     Alcohol use: Yes     Alcohol/week: 8.4 oz     Types: 14 Glasses of wine per week    Drug use: Never    Sexual activity: Not on file   Other Topics Concern    Not on file   Social History Narrative    Not on file     Social Determinants of Health     Financial Resource Strain: Not on file   Food Insecurity: Not on file   Transportation Needs: Not on file   Physical Activity: Not on file   Stress: Not on file   Social Connections: Not on file   Intimate Partner Violence: Not on file   Housing Stability: Not on file       Family History:   No family history on file.    Review of Systems:   All other review of systems are negative except what was mentioned above in the HPI.    Constitutional: No fever, chills, she does complain of malaise, fatigue .    HEENT: No new auditory or visual complaints. No sore throat and neck pain.     Respiratory: Wheezing,  Cardiovascular: Shortness of breath  Gastrointestinal: Heartburn  Genitourinary: Negative for dysuria, hematuria    Musculoskeletal: No new arthralgias or myalgias   Neurological: Negative for focal weakness or headaches.    Endo/Heme/Allergies: No abnormal bleed/bruise.    Psychiatric/Behavioral: No new depression/anxiety.    Physical Exam:   Vitals:   /54   Pulse 69   Temp 36.9 °C (98.5 °F) (Temporal)   Resp 16   Wt 63.1 kg (139 lb 1.8 oz)   LMP  (LMP Unknown)   SpO2 95%   BMI 24.64 kg/m²     General:, alert and oriented x 3.  HEENT: No pallor, icterus. Oropharynx clear.   Neck: Supple, no palpable masses.    CVS: regular rate and rhythm, no rubs or gallops  RESP: Wheezing, no crackles  ABD: Soft, non tender, non distended, positive bowel sounds, no palpable organomegaly  EXT: No edema or cyanosis  CNS: Alert and oriented x3, No focal deficits.        Labs:   Recent Labs     12/27/23  0612 12/27/23  1615 12/28/23  0420 12/28/23  1303 12/28/23  2239 12/29/23  0024 12/29/23  0755   RBC 1.70*  --  2.26*  --   --  2.48*  --    HEMOGLOBIN 5.4*   < > 6.9* 8.9*  "7.8* 7.5*  --    HEMATOCRIT 17.7*  --  21.3*  --   --  23.2*  --    PLATELETCT 203  --  138*  --   --  137*  --    IRON  --   --   --   --   --   --  35*   FERRITIN  --   --   --   --   --   --  365.0*   TOTIRONBC  --   --   --   --   --   --  198*    < > = values in this interval not displayed.     Lab Results   Component Value Date/Time    SODIUM 142 12/28/2023 04:20 AM    POTASSIUM 3.9 12/28/2023 04:20 AM    CHLORIDE 116 (H) 12/28/2023 04:20 AM    CO2 18 (L) 12/28/2023 04:20 AM    GLUCOSE 75 12/28/2023 04:20 AM    BUN 39 (H) 12/28/2023 04:20 AM    CREATININE 0.72 12/28/2023 04:20 AM        Assessment and Plan:    1.  She was found to have a large fungating bleeding mass located in the stomach, CT scans with signs of metastatic disease in liver    2.  GI bleed, due to gastric mass, status post Endo clot for bleeding control  -Ongoing, 2 units of blood transfused    3.  History of pulmonary embolism    4.  Anemia, due to GI bleed    Plan  - awaiting for biopsy results but most likely  stage IV gastric cancer, she does have ongoing bleeding from gastric mass, she was explained stage IV gastric cancer cannot be cured and treatment is palliative, unfortunately she is too frail and with multiple comorbidities in order to get a benefit from treatment. Patient expressed she does not want any treatment, she stated \" let me go, let me die\" during encounter.  Discussed about comfort care/hospice which she strongly agreed.     Discussed with Dr. Jaime to arrange hospice consult    All questions were answered for patient satisfaction    Will sign off, please call back if questions or concerns    She agreed and verbalized her agreement and understanding with the current plan.  I answered all questions and concerns she has at this time.              Thank you for allowing me to participate in her care.    Please note that this dictation was created using voice recognition software. I have made every reasonable attempt to correct " obvious errors, but I expect that there are errors of grammar and possibly content that I did not discover before finalizing the note.      SIGNATURES:  Shay Pappas III, M.D.    CC:  Sorin Garnica M.D.     Present, unchanged

## 2024-10-31 ENCOUNTER — ASOB RESULT (OUTPATIENT)
Age: 34
End: 2024-10-31

## 2024-10-31 ENCOUNTER — APPOINTMENT (OUTPATIENT)
Dept: ANTEPARTUM | Facility: CLINIC | Age: 34
End: 2024-10-31
Payer: MEDICAID

## 2024-10-31 DIAGNOSIS — Z04.9 ENCOUNTER FOR EXAMINATION AND OBSERVATION FOR UNSPECIFIED REASON: ICD-10-CM

## 2024-10-31 LAB
ALBUMIN SERPL ELPH-MCNC: 3.2 G/DL — LOW (ref 3.3–5)
ALBUMIN SERPL ELPH-MCNC: 3.2 G/DL — LOW (ref 3.3–5)
ALP SERPL-CCNC: 114 U/L — SIGNIFICANT CHANGE UP (ref 40–120)
ALP SERPL-CCNC: 117 U/L — SIGNIFICANT CHANGE UP (ref 40–120)
ALT FLD-CCNC: 16 U/L — SIGNIFICANT CHANGE UP (ref 4–33)
ALT FLD-CCNC: 18 U/L — SIGNIFICANT CHANGE UP (ref 4–33)
ANION GAP SERPL CALC-SCNC: 12 MMOL/L — SIGNIFICANT CHANGE UP (ref 7–14)
ANION GAP SERPL CALC-SCNC: 15 MMOL/L — HIGH (ref 7–14)
AST SERPL-CCNC: 22 U/L — SIGNIFICANT CHANGE UP (ref 4–32)
AST SERPL-CCNC: 40 U/L — HIGH (ref 4–32)
BASOPHILS # BLD AUTO: 0.08 K/UL — SIGNIFICANT CHANGE UP (ref 0–0.2)
BASOPHILS # BLD AUTO: 0.08 K/UL — SIGNIFICANT CHANGE UP (ref 0–0.2)
BASOPHILS NFR BLD AUTO: 0.9 % — SIGNIFICANT CHANGE UP (ref 0–2)
BASOPHILS NFR BLD AUTO: 0.9 % — SIGNIFICANT CHANGE UP (ref 0–2)
BILIRUB SERPL-MCNC: 0.4 MG/DL — SIGNIFICANT CHANGE UP (ref 0.2–1.2)
BILIRUB SERPL-MCNC: 0.5 MG/DL — SIGNIFICANT CHANGE UP (ref 0.2–1.2)
BLD GP AB SCN SERPL QL: NEGATIVE — SIGNIFICANT CHANGE UP
BUN SERPL-MCNC: 4 MG/DL — LOW (ref 7–23)
BUN SERPL-MCNC: 5 MG/DL — LOW (ref 7–23)
CALCIUM SERPL-MCNC: 8.8 MG/DL — SIGNIFICANT CHANGE UP (ref 8.4–10.5)
CALCIUM SERPL-MCNC: 8.8 MG/DL — SIGNIFICANT CHANGE UP (ref 8.4–10.5)
CHLORIDE SERPL-SCNC: 104 MMOL/L — SIGNIFICANT CHANGE UP (ref 98–107)
CHLORIDE SERPL-SCNC: 106 MMOL/L — SIGNIFICANT CHANGE UP (ref 98–107)
CO2 SERPL-SCNC: 17 MMOL/L — LOW (ref 22–31)
CO2 SERPL-SCNC: 19 MMOL/L — LOW (ref 22–31)
COLLECT DURATION TIME UR: 24 HR — SIGNIFICANT CHANGE UP
CREAT SERPL-MCNC: 0.61 MG/DL — SIGNIFICANT CHANGE UP (ref 0.5–1.3)
CREAT SERPL-MCNC: 0.65 MG/DL — SIGNIFICANT CHANGE UP (ref 0.5–1.3)
EGFR: 121 ML/MIN/1.73M2 — SIGNIFICANT CHANGE UP
EGFR: 122 ML/MIN/1.73M2 — SIGNIFICANT CHANGE UP
EOSINOPHIL # BLD AUTO: 0.28 K/UL — SIGNIFICANT CHANGE UP (ref 0–0.5)
EOSINOPHIL # BLD AUTO: 0.32 K/UL — SIGNIFICANT CHANGE UP (ref 0–0.5)
EOSINOPHIL NFR BLD AUTO: 3.1 % — SIGNIFICANT CHANGE UP (ref 0–6)
EOSINOPHIL NFR BLD AUTO: 3.5 % — SIGNIFICANT CHANGE UP (ref 0–6)
GLUCOSE SERPL-MCNC: 52 MG/DL — CRITICAL LOW (ref 70–99)
GLUCOSE SERPL-MCNC: 78 MG/DL — SIGNIFICANT CHANGE UP (ref 70–99)
HCT VFR BLD CALC: 35.6 % — SIGNIFICANT CHANGE UP (ref 34.5–45)
HCT VFR BLD CALC: 37.3 % — SIGNIFICANT CHANGE UP (ref 34.5–45)
HCV RNA SPEC NAA+PROBE-LOG IU: SIGNIFICANT CHANGE UP
HCV RNA SPEC NAA+PROBE-LOG IU: SIGNIFICANT CHANGE UP LOGIU/ML
HGB BLD-MCNC: 12.1 G/DL — SIGNIFICANT CHANGE UP (ref 11.5–15.5)
HGB BLD-MCNC: 12.9 G/DL — SIGNIFICANT CHANGE UP (ref 11.5–15.5)
IANC: 5.3 K/UL — SIGNIFICANT CHANGE UP (ref 1.8–7.4)
IANC: 5.47 K/UL — SIGNIFICANT CHANGE UP (ref 1.8–7.4)
IMM GRANULOCYTES NFR BLD AUTO: 4.6 % — HIGH (ref 0–0.9)
IMM GRANULOCYTES NFR BLD AUTO: 5.6 % — HIGH (ref 0–0.9)
LDH SERPL L TO P-CCNC: 240 U/L — HIGH (ref 135–225)
LDH SERPL L TO P-CCNC: 344 U/L — HIGH (ref 135–225)
LYMPHOCYTES # BLD AUTO: 2.04 K/UL — SIGNIFICANT CHANGE UP (ref 1–3.3)
LYMPHOCYTES # BLD AUTO: 2.08 K/UL — SIGNIFICANT CHANGE UP (ref 1–3.3)
LYMPHOCYTES # BLD AUTO: 22.1 % — SIGNIFICANT CHANGE UP (ref 13–44)
LYMPHOCYTES # BLD AUTO: 23 % — SIGNIFICANT CHANGE UP (ref 13–44)
MCHC RBC-ENTMCNC: 29.1 PG — SIGNIFICANT CHANGE UP (ref 27–34)
MCHC RBC-ENTMCNC: 29.1 PG — SIGNIFICANT CHANGE UP (ref 27–34)
MCHC RBC-ENTMCNC: 34 G/DL — SIGNIFICANT CHANGE UP (ref 32–36)
MCHC RBC-ENTMCNC: 34.6 G/DL — SIGNIFICANT CHANGE UP (ref 32–36)
MCV RBC AUTO: 84 FL — SIGNIFICANT CHANGE UP (ref 80–100)
MCV RBC AUTO: 85.6 FL — SIGNIFICANT CHANGE UP (ref 80–100)
MONOCYTES # BLD AUTO: 0.79 K/UL — SIGNIFICANT CHANGE UP (ref 0–0.9)
MONOCYTES # BLD AUTO: 0.89 K/UL — SIGNIFICANT CHANGE UP (ref 0–0.9)
MONOCYTES NFR BLD AUTO: 8.7 % — SIGNIFICANT CHANGE UP (ref 2–14)
MONOCYTES NFR BLD AUTO: 9.7 % — SIGNIFICANT CHANGE UP (ref 2–14)
NEUTROPHILS # BLD AUTO: 5.3 K/UL — SIGNIFICANT CHANGE UP (ref 1.8–7.4)
NEUTROPHILS # BLD AUTO: 5.47 K/UL — SIGNIFICANT CHANGE UP (ref 1.8–7.4)
NEUTROPHILS NFR BLD AUTO: 58.7 % — SIGNIFICANT CHANGE UP (ref 43–77)
NEUTROPHILS NFR BLD AUTO: 59.2 % — SIGNIFICANT CHANGE UP (ref 43–77)
NRBC # BLD: 0 /100 WBCS — SIGNIFICANT CHANGE UP (ref 0–0)
NRBC # BLD: 0 /100 WBCS — SIGNIFICANT CHANGE UP (ref 0–0)
NRBC # FLD: 0.02 K/UL — HIGH (ref 0–0)
NRBC # FLD: 0.02 K/UL — HIGH (ref 0–0)
PLATELET # BLD AUTO: 218 K/UL — SIGNIFICANT CHANGE UP (ref 150–400)
PLATELET # BLD AUTO: 223 K/UL — SIGNIFICANT CHANGE UP (ref 150–400)
POTASSIUM SERPL-MCNC: 3.9 MMOL/L — SIGNIFICANT CHANGE UP (ref 3.5–5.3)
POTASSIUM SERPL-MCNC: 4.1 MMOL/L — SIGNIFICANT CHANGE UP (ref 3.5–5.3)
POTASSIUM SERPL-SCNC: 3.9 MMOL/L — SIGNIFICANT CHANGE UP (ref 3.5–5.3)
POTASSIUM SERPL-SCNC: 4.1 MMOL/L — SIGNIFICANT CHANGE UP (ref 3.5–5.3)
PROT 24H UR-MRATE: 415 MG/24 H — HIGH
PROT SERPL-MCNC: 6.4 G/DL — SIGNIFICANT CHANGE UP (ref 6–8.3)
PROT SERPL-MCNC: 6.6 G/DL — SIGNIFICANT CHANGE UP (ref 6–8.3)
RBC # BLD: 4.16 M/UL — SIGNIFICANT CHANGE UP (ref 3.8–5.2)
RBC # BLD: 4.44 M/UL — SIGNIFICANT CHANGE UP (ref 3.8–5.2)
RBC # FLD: 13.4 % — SIGNIFICANT CHANGE UP (ref 10.3–14.5)
RBC # FLD: 13.7 % — SIGNIFICANT CHANGE UP (ref 10.3–14.5)
RH IG SCN BLD-IMP: POSITIVE — SIGNIFICANT CHANGE UP
SODIUM SERPL-SCNC: 135 MMOL/L — SIGNIFICANT CHANGE UP (ref 135–145)
SODIUM SERPL-SCNC: 138 MMOL/L — SIGNIFICANT CHANGE UP (ref 135–145)
TOTAL VOLUME - 24 HOUR: 1750 ML — SIGNIFICANT CHANGE UP
URATE SERPL-MCNC: 4.1 MG/DL — SIGNIFICANT CHANGE UP (ref 2.5–7)
URATE SERPL-MCNC: 4.1 MG/DL — SIGNIFICANT CHANGE UP (ref 2.5–7)
URINE CREATININE CALCULATION: 1.3 G/24 H — SIGNIFICANT CHANGE UP (ref 0.6–1.6)
WBC # BLD: 9.04 K/UL — SIGNIFICANT CHANGE UP (ref 3.8–10.5)
WBC # BLD: 9.22 K/UL — SIGNIFICANT CHANGE UP (ref 3.8–10.5)
WBC # FLD AUTO: 9.04 K/UL — SIGNIFICANT CHANGE UP (ref 3.8–10.5)
WBC # FLD AUTO: 9.22 K/UL — SIGNIFICANT CHANGE UP (ref 3.8–10.5)

## 2024-10-31 PROCEDURE — 99221 1ST HOSP IP/OBS SF/LOW 40: CPT

## 2024-10-31 PROCEDURE — 76819 FETAL BIOPHYS PROFIL W/O NST: CPT | Mod: 26

## 2024-10-31 RX ORDER — ACETAMINOPHEN 500 MG
975 TABLET ORAL ONCE
Refills: 0 | Status: COMPLETED | OUTPATIENT
Start: 2024-10-31 | End: 2024-10-31

## 2024-10-31 RX ORDER — METOCLOPRAMIDE HCL 10 MG
10 TABLET ORAL ONCE
Refills: 0 | Status: COMPLETED | OUTPATIENT
Start: 2024-10-31 | End: 2024-10-31

## 2024-10-31 RX ORDER — CETIRIZINE HCL 10 MG/1
10 TABLET ORAL DAILY
Refills: 0 | Status: DISCONTINUED | OUTPATIENT
Start: 2024-10-31 | End: 2024-11-03

## 2024-10-31 RX ORDER — PRENATAL VIT/IRON FUM/FOLIC AC 60 MG-1 MG
1 TABLET ORAL DAILY
Refills: 0 | Status: DISCONTINUED | OUTPATIENT
Start: 2024-10-31 | End: 2024-11-01

## 2024-10-31 RX ORDER — CHLORHEXIDINE GLUCONATE 40 MG/ML
1 SOLUTION TOPICAL
Refills: 0 | Status: DISCONTINUED | OUTPATIENT
Start: 2024-10-31 | End: 2024-11-03

## 2024-10-31 RX ORDER — DIPHENHYDRAMINE HCL 12.5MG/5ML
25 ELIXIR ORAL ONCE
Refills: 0 | Status: COMPLETED | OUTPATIENT
Start: 2024-10-31 | End: 2024-10-31

## 2024-10-31 RX ADMIN — Medication 25 MILLIGRAM(S): at 15:28

## 2024-10-31 RX ADMIN — SODIUM CHLORIDE 3 MILLILITER(S): 9 INJECTION, SOLUTION INTRAMUSCULAR; INTRAVENOUS; SUBCUTANEOUS at 05:32

## 2024-10-31 RX ADMIN — SODIUM CHLORIDE 3 MILLILITER(S): 9 INJECTION, SOLUTION INTRAMUSCULAR; INTRAVENOUS; SUBCUTANEOUS at 21:11

## 2024-10-31 RX ADMIN — SODIUM CHLORIDE 3 MILLILITER(S): 9 INJECTION, SOLUTION INTRAMUSCULAR; INTRAVENOUS; SUBCUTANEOUS at 13:16

## 2024-10-31 RX ADMIN — Medication 975 MILLIGRAM(S): at 14:15

## 2024-10-31 RX ADMIN — Medication 975 MILLIGRAM(S): at 13:56

## 2024-10-31 NOTE — CHART NOTE - NSCHARTNOTEFT_GEN_A_CORE
I met with Ms Carlton and her partner. I explained that at 35wks, the infant does not require admission to the NICU unless the infant was in respiratory distress requiring breathing support or if the baby had a low birthweight < 2000g. I explained that late prematurity puts the baby at risk for hypoglycemia and jaundice that could be treated accordingly in the  nursery. Parents are both silent carriers for alpha thalassemia, and have had prenatal counseling with genetics and understand that there is a chance that the baby is a carrier as well. I explained that the [facility]  screen will screen for alpha thalassemia (but not alpha thalassemia trait). Parents expressed understanding of the issues mentioned. They were given opportunity to ask questions and I answered them to the best of my knowledge.

## 2024-10-31 NOTE — PROGRESS NOTE ADULT - SUBJECTIVE AND OBJECTIVE BOX
Antepartum Note, PEC w/o SF    Patient seen and examined at bedside, no acute overnight events. No acute complaints. Pt reports +FM, denies LOF, VB, ctx, HA, epigastric pain, blurred vision, CP, SOB, N/V, fevers, and chills. Endorses mild bilateral pedal edema throughout the pregnancy but has not worsened.    Vital Signs Last 24 Hours  T(C): 36.7 (10-31-24 @ 05:55), Max: 36.7 (10-30-24 @ 21:03)  HR: 85 (10-31-24 @ 05:55) (70 - 98)  BP: 124/86 (10-31-24 @ 05:55) (124/86 - 155/100)  RR: 16 (10-31-24 @ 05:55) (16 - 16)  SpO2: 100% (10-31-24 @ 05:55) (100% - 100%)    CAPILLARY BLOOD GLUCOSE        Physical Exam:  General: NAD  Abdomen: Soft, non-tender, gravid  Ext: No pain or swelling    Labs:             11.8   8.81  )-----------( 225      ( 10-30 @ 19:21 )             35.9     10-30 @ 19:21    137  |  106  |  4   ----------------------------<  79  4.0   |  22  |  0.67    Ca    8.8      10-30 @ 19:21    TPro  6.5  /  Alb  3.4  /  TBili  0.4  /  DBili  x   /  AST  23  /  ALT  19  /  AlkPhos  121  10-30 @ 19:21        Uric Acid: (10-30 @ 19:21)  4.1      Fibrinogen: (10-30 @ 19:21)  --       LDH: (10-30 @ 19:21)  229        MEDICATIONS  (STANDING):  influenza   Vaccine 0.5 milliLiter(s) IntraMuscular once  sodium chloride 0.9% lock flush 3 milliLiter(s) IV Push every 8 hours    MEDICATIONS  (PRN):

## 2024-10-31 NOTE — PROVIDER CONTACT NOTE (CRITICAL VALUE NOTIFICATION) - RECOMMENDATIONS
labs sent this AM @ 0950. labs got lost in transit to lab and may have been sitting out for a while. additional labs sent 1 hr later @ 1030. glucose @ 1030 WNL @ 78

## 2024-10-31 NOTE — PROGRESS NOTE ADULT - ASSESSMENT
34yo  @ 35w2d admitted for PEC w/o SF. Patient received Procardia 30XL after meeting criteria for PEC based on mild range elevated blood pressures and elevated P/C ratio. Blood pressures have remained wnl overnight and this morning. Patient remains clinically stable and fetal status is reassuring. Will continue to monitor for signs and symptoms of worsening PEC.    #PEC w/o SF  - s/p Procardia 30XL  - AM HELLP labs, T&S, prenatal labs  - Monitor BPs  - 24 hour urine protein  - Strict Is and Os    #Fetal wellbeing  - f/u GBS (10/30)    #Maternal wellbeing  - Regular diet  - SCDs for DVT ppx  - PNV/Iron/Colace/Folic acid    Jess De Jesus, MS4  Dr. Kapil Galvez, PGY-3 32yo  @ 35w2d admitted for PEC w/o SF, meeting criteria with mild-range BPs and elevated P/C ratio. Received Pro 30xl x1 dose last night, meds since discontinued. Blood pressures have remained wnl overnight and this morning. Patient remains clinically stable and fetal status is reassuring. Will continue to monitor for signs and symptoms of worsening PEC.    #PEC w/o SF  - s/p Procardia 30XL x1 dose  - AM HELLP labs, T&S, prenatal labs  - Monitor BPs  - collecting 24 hour urine protein  - Strict Is and Os    #Fetal wellbeing  - f/u GBS (10/30)  - NST BID    #Maternal wellbeing  - Regular diet  - SCDs for DVT ppx  - PNV/Iron/Colace/Folic acid    Jess De Jesus, MS4  Kapil Galvez, PGY-3

## 2024-10-31 NOTE — CHART NOTE - NSCHARTNOTEFT_GEN_A_CORE
Plan of care    Notified by RN that patient with SRBP 167/96 at 945pm (repeat 140/85). Given SRBP 169/108 at 930am, pt now meets criteria for sPEC. Denies headaches, fevers, chills, changes in vision, nausea, vomiting, or RUQ pain. Discussed diagnosis with patient and recommendation for IOL. BSUS confirmed vtx, SVE 0.5/0/-3. Will repeat serum PEC labs and start pt on Mg for seizure ppx at this time. Will monitor BPs off antihypertensive and consider starting Procardia 30XL qd if patient has additional SRBPs or sustained MRBPs. Will plan for IOL with VC/CB. All questions answered and patient verbalized understanding.    D/w Dr. Saavedra,   Kiera Zepeda, PGY-3 Plan of care    Notified by RN that patient with SRBP 167/96 at 945pm (repeat 140/85). Given SRBP 169/108 at 930am, pt now meets criteria for sPEC. Denies headaches, fevers, chills, changes in vision, nausea, vomiting, or RUQ pain. Discussed diagnosis with patient and recommendation for IOL. BSUS confirmed vtx, SVE 0.5/0/-3. Will start pt on Procardia 30XL qd, Mg for seizure ppx, Amp for GBS unknown at this time. Will continue to monitor BPs. Will plan for IOL with VC/CB. All questions answered and patient verbalized understanding.    D/w Dr. Saavedra, , and Dr. Quiles, MFM Fellow  Kiera Zepeda, PGY-3 Plan of care    Notified by RN that patient with SRBP 167/96 at 945pm (repeat 140/85). Given SRBP 169/108 at 930am, pt now meets criteria for sPEC. Denies headaches, fevers, chills, changes in vision, nausea, vomiting, or RUQ pain. Discussed diagnosis with patient and recommendation for IOL. Discussed r/b/a of late  steroids, and pt is declining at this time. BSUS confirmed vtx, SVE 0.5/0/-3. Will get STAT HELLP labs, start pt on Procardia 30XL qd, Mg for seizure ppx, Amp for GBS unknown at this time. Will continue to monitor BPs. Will plan for IOL with VC/CB. All questions answered and patient verbalized understanding.    D/w Dr. Saavedra, , and Dr. Quiles, M Fellow  Kiera Zepeda, PGY-3

## 2024-11-01 ENCOUNTER — NON-APPOINTMENT (OUTPATIENT)
Age: 34
End: 2024-11-01

## 2024-11-01 LAB
ALBUMIN SERPL ELPH-MCNC: 3.3 G/DL — SIGNIFICANT CHANGE UP (ref 3.3–5)
ALBUMIN SERPL ELPH-MCNC: 3.4 G/DL — SIGNIFICANT CHANGE UP (ref 3.3–5)
ALP SERPL-CCNC: 125 U/L — HIGH (ref 40–120)
ALP SERPL-CCNC: 132 U/L — HIGH (ref 40–120)
ALT FLD-CCNC: 18 U/L — SIGNIFICANT CHANGE UP (ref 4–33)
ALT FLD-CCNC: 20 U/L — SIGNIFICANT CHANGE UP (ref 4–33)
ANION GAP SERPL CALC-SCNC: 14 MMOL/L — SIGNIFICANT CHANGE UP (ref 7–14)
ANION GAP SERPL CALC-SCNC: 16 MMOL/L — HIGH (ref 7–14)
AST SERPL-CCNC: 24 U/L — SIGNIFICANT CHANGE UP (ref 4–32)
AST SERPL-CCNC: 24 U/L — SIGNIFICANT CHANGE UP (ref 4–32)
BASOPHILS # BLD AUTO: 0.04 K/UL — SIGNIFICANT CHANGE UP (ref 0–0.2)
BASOPHILS # BLD AUTO: 0.09 K/UL — SIGNIFICANT CHANGE UP (ref 0–0.2)
BASOPHILS NFR BLD AUTO: 0.3 % — SIGNIFICANT CHANGE UP (ref 0–2)
BASOPHILS NFR BLD AUTO: 1 % — SIGNIFICANT CHANGE UP (ref 0–2)
BILIRUB SERPL-MCNC: 0.4 MG/DL — SIGNIFICANT CHANGE UP (ref 0.2–1.2)
BILIRUB SERPL-MCNC: 0.4 MG/DL — SIGNIFICANT CHANGE UP (ref 0.2–1.2)
BUN SERPL-MCNC: 6 MG/DL — LOW (ref 7–23)
BUN SERPL-MCNC: 7 MG/DL — SIGNIFICANT CHANGE UP (ref 7–23)
CALCIUM SERPL-MCNC: 7.1 MG/DL — LOW (ref 8.4–10.5)
CALCIUM SERPL-MCNC: 9.2 MG/DL — SIGNIFICANT CHANGE UP (ref 8.4–10.5)
CHLORIDE SERPL-SCNC: 100 MMOL/L — SIGNIFICANT CHANGE UP (ref 98–107)
CHLORIDE SERPL-SCNC: 97 MMOL/L — LOW (ref 98–107)
CO2 SERPL-SCNC: 19 MMOL/L — LOW (ref 22–31)
CO2 SERPL-SCNC: 19 MMOL/L — LOW (ref 22–31)
CREAT SERPL-MCNC: 0.63 MG/DL — SIGNIFICANT CHANGE UP (ref 0.5–1.3)
CREAT SERPL-MCNC: 0.75 MG/DL — SIGNIFICANT CHANGE UP (ref 0.5–1.3)
CULTURE RESULTS: SIGNIFICANT CHANGE UP
EGFR: 109 ML/MIN/1.73M2 — SIGNIFICANT CHANGE UP
EGFR: 122 ML/MIN/1.73M2 — SIGNIFICANT CHANGE UP
EOSINOPHIL # BLD AUTO: 0.02 K/UL — SIGNIFICANT CHANGE UP (ref 0–0.5)
EOSINOPHIL # BLD AUTO: 0.23 K/UL — SIGNIFICANT CHANGE UP (ref 0–0.5)
EOSINOPHIL NFR BLD AUTO: 0.2 % — SIGNIFICANT CHANGE UP (ref 0–6)
EOSINOPHIL NFR BLD AUTO: 2.5 % — SIGNIFICANT CHANGE UP (ref 0–6)
GLUCOSE SERPL-MCNC: 109 MG/DL — HIGH (ref 70–99)
GLUCOSE SERPL-MCNC: 77 MG/DL — SIGNIFICANT CHANGE UP (ref 70–99)
HBV SURFACE AG SERPL QL IA: SIGNIFICANT CHANGE UP
HCT VFR BLD CALC: 36.3 % — SIGNIFICANT CHANGE UP (ref 34.5–45)
HCT VFR BLD CALC: 38.2 % — SIGNIFICANT CHANGE UP (ref 34.5–45)
HGB BLD-MCNC: 12.5 G/DL — SIGNIFICANT CHANGE UP (ref 11.5–15.5)
HGB BLD-MCNC: 13.1 G/DL — SIGNIFICANT CHANGE UP (ref 11.5–15.5)
IANC: 10.42 K/UL — HIGH (ref 1.8–7.4)
IANC: 5.41 K/UL — SIGNIFICANT CHANGE UP (ref 1.8–7.4)
IMM GRANULOCYTES NFR BLD AUTO: 1.4 % — HIGH (ref 0–0.9)
IMM GRANULOCYTES NFR BLD AUTO: 3.6 % — HIGH (ref 0–0.9)
LDH SERPL L TO P-CCNC: 223 U/L — SIGNIFICANT CHANGE UP (ref 135–225)
LDH SERPL L TO P-CCNC: 292 U/L — HIGH (ref 135–225)
LYMPHOCYTES # BLD AUTO: 1.09 K/UL — SIGNIFICANT CHANGE UP (ref 1–3.3)
LYMPHOCYTES # BLD AUTO: 2.15 K/UL — SIGNIFICANT CHANGE UP (ref 1–3.3)
LYMPHOCYTES # BLD AUTO: 23.2 % — SIGNIFICANT CHANGE UP (ref 13–44)
LYMPHOCYTES # BLD AUTO: 8.5 % — LOW (ref 13–44)
MAGNESIUM SERPL-MCNC: 5.4 MG/DL — HIGH (ref 1.6–2.6)
MAGNESIUM SERPL-MCNC: 6.5 MG/DL — HIGH (ref 1.6–2.6)
MAGNESIUM SERPL-MCNC: 6.8 MG/DL — HIGH (ref 1.6–2.6)
MCHC RBC-ENTMCNC: 29.1 PG — SIGNIFICANT CHANGE UP (ref 27–34)
MCHC RBC-ENTMCNC: 29.2 PG — SIGNIFICANT CHANGE UP (ref 27–34)
MCHC RBC-ENTMCNC: 34.3 G/DL — SIGNIFICANT CHANGE UP (ref 32–36)
MCHC RBC-ENTMCNC: 34.4 G/DL — SIGNIFICANT CHANGE UP (ref 32–36)
MCV RBC AUTO: 84.6 FL — SIGNIFICANT CHANGE UP (ref 80–100)
MCV RBC AUTO: 85.1 FL — SIGNIFICANT CHANGE UP (ref 80–100)
MONOCYTES # BLD AUTO: 1.04 K/UL — HIGH (ref 0–0.9)
MONOCYTES # BLD AUTO: 1.1 K/UL — HIGH (ref 0–0.9)
MONOCYTES NFR BLD AUTO: 11.2 % — SIGNIFICANT CHANGE UP (ref 2–14)
MONOCYTES NFR BLD AUTO: 8.6 % — SIGNIFICANT CHANGE UP (ref 2–14)
NEUTROPHILS # BLD AUTO: 10.42 K/UL — HIGH (ref 1.8–7.4)
NEUTROPHILS # BLD AUTO: 5.41 K/UL — SIGNIFICANT CHANGE UP (ref 1.8–7.4)
NEUTROPHILS NFR BLD AUTO: 58.5 % — SIGNIFICANT CHANGE UP (ref 43–77)
NEUTROPHILS NFR BLD AUTO: 81 % — HIGH (ref 43–77)
NRBC # BLD: 0 /100 WBCS — SIGNIFICANT CHANGE UP (ref 0–0)
NRBC # BLD: 0 /100 WBCS — SIGNIFICANT CHANGE UP (ref 0–0)
NRBC # FLD: 0 K/UL — SIGNIFICANT CHANGE UP (ref 0–0)
NRBC # FLD: 0.02 K/UL — HIGH (ref 0–0)
PLATELET # BLD AUTO: 238 K/UL — SIGNIFICANT CHANGE UP (ref 150–400)
PLATELET # BLD AUTO: 244 K/UL — SIGNIFICANT CHANGE UP (ref 150–400)
POTASSIUM SERPL-MCNC: 3.8 MMOL/L — SIGNIFICANT CHANGE UP (ref 3.5–5.3)
POTASSIUM SERPL-MCNC: 4.1 MMOL/L — SIGNIFICANT CHANGE UP (ref 3.5–5.3)
POTASSIUM SERPL-SCNC: 3.8 MMOL/L — SIGNIFICANT CHANGE UP (ref 3.5–5.3)
POTASSIUM SERPL-SCNC: 4.1 MMOL/L — SIGNIFICANT CHANGE UP (ref 3.5–5.3)
PROT SERPL-MCNC: 6.3 G/DL — SIGNIFICANT CHANGE UP (ref 6–8.3)
PROT SERPL-MCNC: 6.7 G/DL — SIGNIFICANT CHANGE UP (ref 6–8.3)
RBC # BLD: 4.29 M/UL — SIGNIFICANT CHANGE UP (ref 3.8–5.2)
RBC # BLD: 4.49 M/UL — SIGNIFICANT CHANGE UP (ref 3.8–5.2)
RBC # FLD: 13.5 % — SIGNIFICANT CHANGE UP (ref 10.3–14.5)
RBC # FLD: 13.6 % — SIGNIFICANT CHANGE UP (ref 10.3–14.5)
RUBV IGG SER-ACNC: 0.91 INDEX — SIGNIFICANT CHANGE UP
RUBV IGG SER-IMP: SIGNIFICANT CHANGE UP
SODIUM SERPL-SCNC: 132 MMOL/L — LOW (ref 135–145)
SODIUM SERPL-SCNC: 133 MMOL/L — LOW (ref 135–145)
SPECIMEN SOURCE: SIGNIFICANT CHANGE UP
URATE SERPL-MCNC: 3.8 MG/DL — SIGNIFICANT CHANGE UP (ref 2.5–7)
URATE SERPL-MCNC: 4.9 MG/DL — SIGNIFICANT CHANGE UP (ref 2.5–7)
WBC # BLD: 12.85 K/UL — HIGH (ref 3.8–10.5)
WBC # BLD: 9.25 K/UL — SIGNIFICANT CHANGE UP (ref 3.8–10.5)
WBC # FLD AUTO: 12.85 K/UL — HIGH (ref 3.8–10.5)
WBC # FLD AUTO: 9.25 K/UL — SIGNIFICANT CHANGE UP (ref 3.8–10.5)

## 2024-11-01 PROCEDURE — 59409 OBSTETRICAL CARE: CPT | Mod: U7

## 2024-11-01 RX ORDER — ACETAMINOPHEN 500 MG
975 TABLET ORAL
Refills: 0 | Status: DISCONTINUED | OUTPATIENT
Start: 2024-11-01 | End: 2024-11-03

## 2024-11-01 RX ORDER — DIBUCAINE 1 %
1 OINTMENT (GRAM) TOPICAL EVERY 6 HOURS
Refills: 0 | Status: DISCONTINUED | OUTPATIENT
Start: 2024-11-01 | End: 2024-11-03

## 2024-11-01 RX ORDER — PRENATAL VIT/IRON FUM/FOLIC AC 60 MG-1 MG
1 TABLET ORAL DAILY
Refills: 0 | Status: DISCONTINUED | OUTPATIENT
Start: 2024-11-01 | End: 2024-11-03

## 2024-11-01 RX ORDER — MAGNESIUM SULFATE IN 0.9% NACL 2 G/50 ML
2 INTRAVENOUS SOLUTION, PIGGYBACK (ML) INTRAVENOUS
Qty: 40 | Refills: 0 | Status: DISCONTINUED | OUTPATIENT
Start: 2024-11-01 | End: 2024-11-02

## 2024-11-01 RX ORDER — OXYTOCIN IN D5W-0.2% SODIUM CL 15/250 ML
167 PLASTIC BAG, INJECTION (ML) INTRAVENOUS
Qty: 30 | Refills: 0 | Status: DISCONTINUED | OUTPATIENT
Start: 2024-11-01 | End: 2024-11-03

## 2024-11-01 RX ORDER — MODIFIED LANOLIN
1 OINTMENT (GRAM) TOPICAL EVERY 6 HOURS
Refills: 0 | Status: DISCONTINUED | OUTPATIENT
Start: 2024-11-01 | End: 2024-11-03

## 2024-11-01 RX ORDER — DIPHENHYDRAMINE HCL 12.5MG/5ML
25 ELIXIR ORAL EVERY 6 HOURS
Refills: 0 | Status: DISCONTINUED | OUTPATIENT
Start: 2024-11-01 | End: 2024-11-03

## 2024-11-01 RX ORDER — MAGNESIUM SULFATE IN 0.9% NACL 2 G/50 ML
2 INTRAVENOUS SOLUTION, PIGGYBACK (ML) INTRAVENOUS
Qty: 40 | Refills: 0 | Status: DISCONTINUED | OUTPATIENT
Start: 2024-11-01 | End: 2024-11-01

## 2024-11-01 RX ORDER — OXYCODONE HYDROCHLORIDE 30 MG/1
5 TABLET ORAL
Refills: 0 | Status: DISCONTINUED | OUTPATIENT
Start: 2024-11-01 | End: 2024-11-03

## 2024-11-01 RX ORDER — CLOSTRIDIUM TETANI TOXOID ANTIGEN (FORMALDEHYDE INACTIVATED), CORYNEBACTERIUM DIPHTHERIAE TOXOID ANTIGEN (FORMALDEHYDE INACTIVATED), BORDETELLA PERTUSSIS TOXOID ANTIGEN (GLUTARALDEHYDE INACTIVATED), BORDETELLA PERTUSSIS FILAMENTOUS HEMAGGLUTININ ANTIGEN (FORMALDEHYDE INACTIVATED), BORDETELLA PERTUSSIS PERTACTIN ANTIGEN, AND BORDETELLA PERTUSSIS FIMBRIAE 2/3 ANTIGEN 5; 2; 2.5; 5; 3; 5 [LF]/.5ML; [LF]/.5ML; UG/.5ML; UG/.5ML; UG/.5ML; UG/.5ML
0.5 INJECTION, SUSPENSION INTRAMUSCULAR ONCE
Refills: 0 | Status: DISCONTINUED | OUTPATIENT
Start: 2024-11-01 | End: 2024-11-03

## 2024-11-01 RX ORDER — MAGNESIUM SULFATE IN 0.9% NACL 2 G/50 ML
4 INTRAVENOUS SOLUTION, PIGGYBACK (ML) INTRAVENOUS ONCE
Refills: 0 | Status: DISCONTINUED | OUTPATIENT
Start: 2024-11-01 | End: 2024-11-01

## 2024-11-01 RX ORDER — AMPICILLIN 2 G/1
2 INJECTION, POWDER, FOR SOLUTION INTRAVENOUS ONCE
Refills: 0 | Status: COMPLETED | OUTPATIENT
Start: 2024-11-01 | End: 2024-11-01

## 2024-11-01 RX ORDER — OXYCODONE HYDROCHLORIDE 30 MG/1
5 TABLET ORAL ONCE
Refills: 0 | Status: DISCONTINUED | OUTPATIENT
Start: 2024-11-01 | End: 2024-11-03

## 2024-11-01 RX ORDER — ONDANSETRON HYDROCHLORIDE 2 MG/ML
4 INJECTION, SOLUTION INTRAMUSCULAR; INTRAVENOUS ONCE
Refills: 0 | Status: COMPLETED | OUTPATIENT
Start: 2024-11-01 | End: 2024-11-01

## 2024-11-01 RX ORDER — BENZOCAINE 200 MG/G
1 GEL ORAL EVERY 6 HOURS
Refills: 0 | Status: DISCONTINUED | OUTPATIENT
Start: 2024-11-01 | End: 2024-11-03

## 2024-11-01 RX ORDER — IBUPROFEN 200 MG
600 TABLET ORAL EVERY 6 HOURS
Refills: 0 | Status: DISCONTINUED | OUTPATIENT
Start: 2024-11-01 | End: 2024-11-03

## 2024-11-01 RX ORDER — OXYTOCIN IN D5W-0.2% SODIUM CL 15/250 ML
2 PLASTIC BAG, INJECTION (ML) INTRAVENOUS
Qty: 30 | Refills: 0 | Status: DISCONTINUED | OUTPATIENT
Start: 2024-11-01 | End: 2024-11-03

## 2024-11-01 RX ORDER — MAGNESIUM HYDROXIDE 1200 MG/15ML
30 SUSPENSION ORAL
Refills: 0 | Status: DISCONTINUED | OUTPATIENT
Start: 2024-11-01 | End: 2024-11-03

## 2024-11-01 RX ORDER — SODIUM CHLORIDE 9 MG/ML
3 INJECTION, SOLUTION INTRAMUSCULAR; INTRAVENOUS; SUBCUTANEOUS EVERY 8 HOURS
Refills: 0 | Status: DISCONTINUED | OUTPATIENT
Start: 2024-11-01 | End: 2024-11-03

## 2024-11-01 RX ORDER — MAGNESIUM SULFATE IN 0.9% NACL 2 G/50 ML
4 INTRAVENOUS SOLUTION, PIGGYBACK (ML) INTRAVENOUS ONCE
Refills: 0 | Status: COMPLETED | OUTPATIENT
Start: 2024-11-01 | End: 2024-11-01

## 2024-11-01 RX ORDER — IBUPROFEN 200 MG
600 TABLET ORAL EVERY 6 HOURS
Refills: 0 | Status: COMPLETED | OUTPATIENT
Start: 2024-11-01 | End: 2025-09-30

## 2024-11-01 RX ORDER — NIFEDIPINE 90 MG
30 TABLET, EXTENDED RELEASE 24 HR ORAL DAILY
Refills: 0 | Status: DISCONTINUED | OUTPATIENT
Start: 2024-11-01 | End: 2024-11-03

## 2024-11-01 RX ORDER — PRAMOXINE HCL 1 %
1 CREAM (GRAM) RECTAL EVERY 4 HOURS
Refills: 0 | Status: DISCONTINUED | OUTPATIENT
Start: 2024-11-01 | End: 2024-11-03

## 2024-11-01 RX ORDER — LABETALOL HCL 200 MG
20 TABLET ORAL ONCE
Refills: 0 | Status: COMPLETED | OUTPATIENT
Start: 2024-11-01 | End: 2024-11-01

## 2024-11-01 RX ORDER — SIMETHICONE 80 MG/1
80 TABLET, CHEWABLE ORAL EVERY 4 HOURS
Refills: 0 | Status: DISCONTINUED | OUTPATIENT
Start: 2024-11-01 | End: 2024-11-03

## 2024-11-01 RX ORDER — AMPICILLIN 2 G/1
1 INJECTION, POWDER, FOR SOLUTION INTRAVENOUS EVERY 4 HOURS
Refills: 0 | Status: DISCONTINUED | OUTPATIENT
Start: 2024-11-01 | End: 2024-11-01

## 2024-11-01 RX ORDER — HYDROCORTISONE 1 %
1 OINTMENT (GRAM) TOPICAL EVERY 6 HOURS
Refills: 0 | Status: DISCONTINUED | OUTPATIENT
Start: 2024-11-01 | End: 2024-11-03

## 2024-11-01 RX ORDER — KETOROLAC TROMETHAMINE 30 MG/ML
30 INJECTION INTRAMUSCULAR; INTRAVENOUS ONCE
Refills: 0 | Status: DISCONTINUED | OUTPATIENT
Start: 2024-11-01 | End: 2024-11-01

## 2024-11-01 RX ADMIN — Medication 30 MILLIGRAM(S): at 00:31

## 2024-11-01 RX ADMIN — Medication 30 MILLIGRAM(S): at 23:46

## 2024-11-01 RX ADMIN — CETIRIZINE HCL 10 MILLIGRAM(S): 10 TABLET ORAL at 23:46

## 2024-11-01 RX ADMIN — ONDANSETRON HYDROCHLORIDE 4 MILLIGRAM(S): 2 INJECTION, SOLUTION INTRAMUSCULAR; INTRAVENOUS at 14:00

## 2024-11-01 RX ADMIN — AMPICILLIN 108 GRAM(S): 2 INJECTION, POWDER, FOR SOLUTION INTRAVENOUS at 17:32

## 2024-11-01 RX ADMIN — AMPICILLIN 200 GRAM(S): 2 INJECTION, POWDER, FOR SOLUTION INTRAVENOUS at 01:29

## 2024-11-01 RX ADMIN — CHLORHEXIDINE GLUCONATE 1 APPLICATION(S): 40 SOLUTION TOPICAL at 05:33

## 2024-11-01 RX ADMIN — Medication 50 GM/HR: at 21:22

## 2024-11-01 RX ADMIN — AMPICILLIN 108 GRAM(S): 2 INJECTION, POWDER, FOR SOLUTION INTRAVENOUS at 09:44

## 2024-11-01 RX ADMIN — AMPICILLIN 108 GRAM(S): 2 INJECTION, POWDER, FOR SOLUTION INTRAVENOUS at 13:43

## 2024-11-01 RX ADMIN — SODIUM CHLORIDE 3 MILLILITER(S): 9 INJECTION, SOLUTION INTRAMUSCULAR; INTRAVENOUS; SUBCUTANEOUS at 06:18

## 2024-11-01 RX ADMIN — Medication 50 GM/HR: at 07:12

## 2024-11-01 RX ADMIN — Medication 600 MILLIGRAM(S): at 23:46

## 2024-11-01 RX ADMIN — Medication 2 MILLIUNIT(S)/MIN: at 11:42

## 2024-11-01 RX ADMIN — AMPICILLIN 108 GRAM(S): 2 INJECTION, POWDER, FOR SOLUTION INTRAVENOUS at 05:32

## 2024-11-01 RX ADMIN — Medication 50 GM/HR: at 01:05

## 2024-11-01 RX ADMIN — Medication 300 GRAM(S): at 00:40

## 2024-11-01 RX ADMIN — Medication 20 MILLIGRAM(S): at 19:19

## 2024-11-01 RX ADMIN — Medication 50 MILLILITER(S): at 01:30

## 2024-11-01 RX ADMIN — SODIUM CHLORIDE 3 MILLILITER(S): 9 INJECTION, SOLUTION INTRAMUSCULAR; INTRAVENOUS; SUBCUTANEOUS at 15:38

## 2024-11-01 NOTE — OB RN DELIVERY SUMMARY - NSSELHIDDEN_OBGYN_ALL_OB_FT
[NS_DeliveryAttending1_OBGYN_ALL_OB_FT:[id number]],[NS_DeliveryAssist1_OBGYN_ALL_OB_FT:[id number]],[NS_DeliveryAssist2_OBGYN_ALL_OB_FT:[id number]],[NS_DeliveryRN_OBGYN_ALL_OB_FT:[id number]],[NS_CirculateRN2_OBGYN_ALL_OB_FT:[id number]]

## 2024-11-01 NOTE — OB PROVIDER LABOR PROGRESS NOTE - NS_SUBJECTIVE/OBJECTIVE_OBGYN_ALL_OB_FT
Pt examined to place vaginal cytotec
R3 Labor Note    S: Patient evaluated at bedside for placement of VC#2. Pt comfortable with epidural. CB still in place.    O:  T(C): 36.6 (11-01-24 @ 02:05), Max: 37.1 (10-31-24 @ 18:43)  HR: 93 (11-01-24 @ 04:17) (74 - 121)  BP: 148/87 (11-01-24 @ 04:17) (124/86 - 169/108)  RR: 16 (11-01-24 @ 02:05) (15 - 19)  SpO2: 97% (11-01-24 @ 04:14) (93% - 100%)
R3 Labor Note    S: Patient evaluated at bedside for placement of CB and VC#1. CB placed with 60cc/60cc NS. Pt tolerated placement well.    O:  T(C): 36.7 (11-01-24 @ 01:06), Max: 37.1 (10-31-24 @ 18:43)  HR: 105 (11-01-24 @ 01:12) (74 - 105)  BP: 148/91 (11-01-24 @ 01:06) (124/86 - 169/108)  RR: 16 (11-01-24 @ 01:06) (15 - 19)  SpO2: 97% (11-01-24 @ 01:17) (96% - 100%)

## 2024-11-01 NOTE — OB NEONATOLOGY/PEDIATRICIAN DELIVERY SUMMARY - NSPEDSNEONOTESA_OBGYN_ALL_OB_FT
Pediatrician called to delivery for category II tracing. 35.3 wk SGA male born via  to a 34 y/o  mother.  Maternal history of pre-eclampsia and asthma. No significant prenatal history. Maternal labs include Blood Type O+ , HIV - , RPR - , Rubella equivocal , Hep B - , GBS unknown (received ampicillin x5 last dose 17:30 on ). ROM at 14:37 on 10/01 with clear fluids (ROM hours: 3H50M). Nuchal x1. Cord clamping was delayed 30secs. Baby emerged with poor tone and color. Baby was warmed, dried, suctioned, and stimulated with APGARS of 5/8. Resuscitation included deep tracheal suctioning and CPAP for 15mins, tolerating wean to RA. Mom plans to initiate breastfeeding, declines Hep B vaccine and consents circumcision. Highest maternal temp: 36.8C. EOS 0.17. Admitted under Dr. Becky Cameron. Baby stable for transfer to  nursery. Parents updated.    :   TOB: 18:27  BW: 2100g

## 2024-11-01 NOTE — OB PROVIDER LABOR PROGRESS NOTE - ASSESSMENT
A/P 33y  @35w3d IOL for sPEC  -IOL: CB in place, c/w BC  -Cat 1 tracing  -GBS unknown on Amp  -EFW 2300  -sPEC - no recent SRBPs, denies sxs of PEC, f/u HELLP labs, c/w [id number] qd, Magnesium, continue to monitor  -Analgesia - not requesting pain mgmt at this time  -Anticipate     d/w Dr. Quentin Zepeda, PGY-3 A/P 33y  @35w3d IOL for sPEC  -IOL: CB in place, c/w VC  -Cat 1 tracing  -GBS unknown on Amp  -EFW 2300  -sPEC - no recent SRBPs, denies sxs of PEC, f/u HELLP labs, c/w [id number] qd, Magnesium, continue to monitor  -Analgesia - not requesting pain mgmt at this time  -Anticipate     d/w Dr. Quentin Zepeda, PGY-3

## 2024-11-01 NOTE — OB PROVIDER DELIVERY SUMMARY - NS_DELIVERYATTENDING1_OBGYN_ALL_OB_FT
Initial Complaint: facial swelling    Started: yesterday    Endorses: ate pork skin. Yesterday started with eye around the left eye. Today that is improved but nwo with left face and upper lip swelling. Pain in the left face. Denies: dental pain, gum swelling, F/C, N/V, difficulty swallowing, change in voice. Made better: nothing  Made worse: nothing    No further complaints. No past medical history on file. No past surgical history on file. Reviewed      Primary care provider: No primary care provider on file. The history is provided by the patient. No  was used. No past medical history on file. No past surgical history on file. No family history on file.     Social History     Socioeconomic History    Marital status: Not on file     Spouse name: Not on file    Number of children: Not on file    Years of education: Not on file    Highest education level: Not on file   Occupational History    Not on file   Social Needs    Financial resource strain: Not on file    Food insecurity:     Worry: Not on file     Inability: Not on file    Transportation needs:     Medical: Not on file     Non-medical: Not on file   Tobacco Use    Smoking status: Not on file   Substance and Sexual Activity    Alcohol use: Not on file    Drug use: Not on file    Sexual activity: Not on file   Lifestyle    Physical activity:     Days per week: Not on file     Minutes per session: Not on file    Stress: Not on file   Relationships    Social connections:     Talks on phone: Not on file     Gets together: Not on file     Attends Yarsani service: Not on file     Active member of club or organization: Not on file     Attends meetings of clubs or organizations: Not on file     Relationship status: Not on file    Intimate partner violence:     Fear of current or ex partner: Not on file     Emotionally abused: Not on file     Physically abused: Not on file     Forced sexual activity: Not on file   Other Topics Concern    Not on file   Social History Narrative    Not on file     ALLERGIES: Patient has no allergy information on record. Review of Systems   Constitutional: Negative for activity change, chills and fever. HENT: Positive for facial swelling. Negative for congestion, drooling, ear pain, sore throat, trouble swallowing and voice change. Respiratory: Negative for shortness of breath. Cardiovascular: Negative for chest pain. Psychiatric/Behavioral: Negative. All other systems reviewed and are negative. Vitals:    06/09/19 0937 06/09/19 0950   BP:  135/82   Pulse: 79 78   Resp:  16   Temp:  98.9 °F (37.2 °C)   SpO2: 97% 99%   Weight:  85.1 kg (187 lb 9.8 oz)   Height:  5' 5\" (1.651 m)          Physical Exam   Constitutional: She is oriented to person, place, and time. Vital signs are normal. She appears well-developed and well-nourished. She is active and cooperative. Non-toxic appearance. She does not have a sickly appearance. She does not appear ill. No distress. HENT:   Head: Normocephalic and atraumatic. Nose: Nose normal.   Mouth/Throat: Uvula is midline and oropharynx is clear and moist. No oral lesions. Normal dentition. No dental abscesses, uvula swelling, lacerations or dental caries. No posterior oropharyngeal edema or posterior oropharyngeal erythema. Upper lip swelling, left facial swelling. No dental caries. No gingival edema, erythema. Tongue may be slightly swollen    Neck: Trachea normal, normal range of motion, full passive range of motion without pain and phonation normal. Neck supple. Cardiovascular: Normal rate, regular rhythm, normal heart sounds and intact distal pulses. Pulmonary/Chest: Effort normal and breath sounds normal. No stridor. No respiratory distress. She has no wheezes. She has no rales. She exhibits no tenderness. Abdominal: Soft. Bowel sounds are normal. There is no tenderness. There is no guarding. Musculoskeletal: Normal range of motion. Lymphadenopathy:        Head (right side): No submental, no submandibular, no tonsillar, no preauricular and no posterior auricular adenopathy present. Head (left side): No submental, no submandibular, no tonsillar, no preauricular and no posterior auricular adenopathy present. She has no cervical adenopathy. Neurological: She is alert and oriented to person, place, and time. A sensory deficit is present. No cranial nerve deficit. GCS eye subscore is 4. GCS verbal subscore is 5. GCS motor subscore is 6. Sensation difference at the edge of the jaw. Skin: Skin is warm and dry. No erythema. Psychiatric: She has a normal mood and affect. Her behavior is normal. Judgment and thought content normal.   Nursing note and vitals reviewed. [facility]       Procedures    Assessment & Plan:     Orders Placed This Encounter    Hold Sample    POC URINE PREGNANCY TEST    LEAVE IV IN PLACE    famotidine (PF) (PEPCID) 20 mg in sodium chloride 0.9% 10 mL injection    methylPREDNISolone (PF) (SOLU-MEDROL) injection 125 mg    diphenhydrAMINE (BENADRYL) injection 25 mg       Discussed with Karly Pelayo MD,ED Provider    Chelsie Callejas NP  06/09/19  10:01 AM    Lip is improved but not completely resolved. Dc with prednisone, Pepcid & benadryl. Referral to [facility]. Discussed return precautions. LABORATORY TESTS:  Labs Reviewed   SAMPLES BEING HELD   HCG URINE, QL. - POC       IMAGING RESULTS:  No results found. MEDICATIONS GIVEN:  Medications   famotidine (PF) (PEPCID) 20 mg in sodium chloride 0.9% 10 mL injection (20 mg IntraVENous Given 6/9/19 1042)   methylPREDNISolone (PF) (SOLU-MEDROL) injection 125 mg (125 mg IntraVENous Given 6/9/19 1042)   diphenhydrAMINE (BENADRYL) injection 25 mg (25 mg IntraVENous Given 6/9/19 1042)       IMPRESSION:  1. Allergic reaction, initial encounter        PLAN:  1.    Current Discharge Medication List      START taking these medications    Details   famotidine (PEPCID) 20 mg tablet Take 1 Tab by mouth two (2) times a day for 10 days. Qty: 20 Tab, Refills: 0      predniSONE (DELTASONE) 20 mg tablet Take 60 mg by mouth daily for 5 days. With Breakfast  Qty: 15 Tab, Refills: 0      diphenhydrAMINE (BENADRYL) 25 mg capsule Take 1 Cap by mouth every six (6) hours as needed (while you have face/lip swelling) for up to 10 days. Qty: 30 Cap, Refills: 0           2. Follow-up Information     Follow up With Specialties Details Why [address] Schedule an appointment as soon as possible for a visit Primary care referral [address] [facility]    [address] [facility] Emergency Medicine  As needed, If symptoms worsen [address] [facility]  [phone]        3.      Return to ED for new or worsening symptoms       Marlen Chang NP Tammi Garcia MD

## 2024-11-01 NOTE — OB RN DELIVERY SUMMARY - NS_SEPSISRSKCALC_OBGYN_ALL_OB_FT
EOS calculated successfully. EOS Risk Factor: 0.5/1000 live births ([facility] national incidence); GA=35w3d; Temp=98.7; ROM=3.833; GBS='Unknown'; Antibiotics='GBS specific antibiotics > 2 hrs prior to birth'

## 2024-11-01 NOTE — OB PROVIDER DELIVERY SUMMARY - NSPROVIDERDELIVERYNOTE_OBGYN_ALL_OB_FT
Patient was found to be fully dilated and directed to push with contractions.  Spontaneous vaginal delivery of liveborn female infant.  Head, shoulders and body delivered easily. Nuchal x1.  Cord was delayed 1 minute. Cord was cut.  Infant was passed to mother.  Placenta delivered spontaneously intact. Uterine massage was performed and pitocin was given, with additional 10u IM pitocin given for KEITH atony.  Vaginal walls, sulci, and cervix examined and noted to be intact.  Fundus was firm. No lacerations.  Good hemostasis was noted.  Count correct x2.

## 2024-11-01 NOTE — OB PROVIDER LABOR PROGRESS NOTE - ASSESSMENT
Uterine balloon firmly in place however more effaced cervix  vaginal cytotec placed  Pt comfortable with epidural    Sherri Sheu  PGY4

## 2024-11-01 NOTE — OB PROVIDER DELIVERY SUMMARY - NSSELHIDDEN_OBGYN_ALL_OB_FT
[[email or web address]_ALL_OB_FT:[id number]],[NS_DeliveryAssist1_OBGYN_ALL_OB_FT:[id number]],[NS_DeliveryAssist2_OBGYN_ALL_OB_FT:[id number]]

## 2024-11-01 NOTE — PROVIDER CONTACT NOTE (OTHER) - SITUATION
Patient complains of SOB And "elephant sitting on chest" when she has contractions only
pt is sPEC on mag - postpartum from  at 18:27

## 2024-11-01 NOTE — OB PROVIDER LABOR PROGRESS NOTE - ASSESSMENT
A/P 33y  @35w3d IOL for sPEC  -IOL: CB in place, c/w VC  -Cat 1 tracing  -GBS unknown on Amp  -EFW 2300  -sPEC - 1 SRBP after epidural at 4a (not sustained), denies sxs of PEC, f/u HELLP labs, c/w [id number] qd, Magnesium, continue to monitor  -Analgesia - epi  -Anticipate     d/w Dr. Quentin Zepeda, PGY-3

## 2024-11-02 LAB
ALBUMIN SERPL ELPH-MCNC: 3 G/DL — LOW (ref 3.3–5)
ALP SERPL-CCNC: 115 U/L — SIGNIFICANT CHANGE UP (ref 40–120)
ALT FLD-CCNC: 18 U/L — SIGNIFICANT CHANGE UP (ref 4–33)
ANION GAP SERPL CALC-SCNC: 11 MMOL/L — SIGNIFICANT CHANGE UP (ref 7–14)
AST SERPL-CCNC: 25 U/L — SIGNIFICANT CHANGE UP (ref 4–32)
BASOPHILS # BLD AUTO: 0.04 K/UL — SIGNIFICANT CHANGE UP (ref 0–0.2)
BASOPHILS NFR BLD AUTO: 0.3 % — SIGNIFICANT CHANGE UP (ref 0–2)
BILIRUB SERPL-MCNC: 0.5 MG/DL — SIGNIFICANT CHANGE UP (ref 0.2–1.2)
BUN SERPL-MCNC: 4 MG/DL — LOW (ref 7–23)
CALCIUM SERPL-MCNC: 6.6 MG/DL — LOW (ref 8.4–10.5)
CHLORIDE SERPL-SCNC: 103 MMOL/L — SIGNIFICANT CHANGE UP (ref 98–107)
CO2 SERPL-SCNC: 23 MMOL/L — SIGNIFICANT CHANGE UP (ref 22–31)
CREAT SERPL-MCNC: 0.63 MG/DL — SIGNIFICANT CHANGE UP (ref 0.5–1.3)
EGFR: 122 ML/MIN/1.73M2 — SIGNIFICANT CHANGE UP
EOSINOPHIL # BLD AUTO: 0.19 K/UL — SIGNIFICANT CHANGE UP (ref 0–0.5)
EOSINOPHIL NFR BLD AUTO: 1.5 % — SIGNIFICANT CHANGE UP (ref 0–6)
GLUCOSE SERPL-MCNC: 98 MG/DL — SIGNIFICANT CHANGE UP (ref 70–99)
HCT VFR BLD CALC: 34.6 % — SIGNIFICANT CHANGE UP (ref 34.5–45)
HGB BLD-MCNC: 11.8 G/DL — SIGNIFICANT CHANGE UP (ref 11.5–15.5)
IANC: 9.58 K/UL — HIGH (ref 1.8–7.4)
IMM GRANULOCYTES NFR BLD AUTO: 1.2 % — HIGH (ref 0–0.9)
LDH SERPL L TO P-CCNC: 310 U/L — HIGH (ref 135–225)
LYMPHOCYTES # BLD AUTO: 1.77 K/UL — SIGNIFICANT CHANGE UP (ref 1–3.3)
LYMPHOCYTES # BLD AUTO: 13.9 % — SIGNIFICANT CHANGE UP (ref 13–44)
MAGNESIUM SERPL-MCNC: 5.6 MG/DL — HIGH (ref 1.6–2.6)
MAGNESIUM SERPL-MCNC: 6 MG/DL — HIGH (ref 1.6–2.6)
MAGNESIUM SERPL-MCNC: 6.1 MG/DL — HIGH (ref 1.6–2.6)
MAGNESIUM SERPL-MCNC: 6.2 MG/DL — HIGH (ref 1.6–2.6)
MCHC RBC-ENTMCNC: 29.2 PG — SIGNIFICANT CHANGE UP (ref 27–34)
MCHC RBC-ENTMCNC: 34.1 G/DL — SIGNIFICANT CHANGE UP (ref 32–36)
MCV RBC AUTO: 85.6 FL — SIGNIFICANT CHANGE UP (ref 80–100)
MONOCYTES # BLD AUTO: 0.99 K/UL — HIGH (ref 0–0.9)
MONOCYTES NFR BLD AUTO: 7.8 % — SIGNIFICANT CHANGE UP (ref 2–14)
NEUTROPHILS # BLD AUTO: 9.58 K/UL — HIGH (ref 1.8–7.4)
NEUTROPHILS NFR BLD AUTO: 75.3 % — SIGNIFICANT CHANGE UP (ref 43–77)
NRBC # BLD: 0 /100 WBCS — SIGNIFICANT CHANGE UP (ref 0–0)
NRBC # FLD: 0 K/UL — SIGNIFICANT CHANGE UP (ref 0–0)
PLATELET # BLD AUTO: 233 K/UL — SIGNIFICANT CHANGE UP (ref 150–400)
POTASSIUM SERPL-MCNC: 3.9 MMOL/L — SIGNIFICANT CHANGE UP (ref 3.5–5.3)
POTASSIUM SERPL-SCNC: 3.9 MMOL/L — SIGNIFICANT CHANGE UP (ref 3.5–5.3)
PROT SERPL-MCNC: 6.2 G/DL — SIGNIFICANT CHANGE UP (ref 6–8.3)
RBC # BLD: 4.04 M/UL — SIGNIFICANT CHANGE UP (ref 3.8–5.2)
RBC # FLD: 13.7 % — SIGNIFICANT CHANGE UP (ref 10.3–14.5)
SODIUM SERPL-SCNC: 137 MMOL/L — SIGNIFICANT CHANGE UP (ref 135–145)
URATE SERPL-MCNC: 4.3 MG/DL — SIGNIFICANT CHANGE UP (ref 2.5–7)
WBC # BLD: 12.72 K/UL — HIGH (ref 3.8–10.5)
WBC # FLD AUTO: 12.72 K/UL — HIGH (ref 3.8–10.5)

## 2024-11-02 RX ORDER — GUAIFENESIN 100 MG/5ML
600 LIQUID ORAL EVERY 12 HOURS
Refills: 0 | Status: DISCONTINUED | OUTPATIENT
Start: 2024-11-02 | End: 2024-11-03

## 2024-11-02 RX ADMIN — SODIUM CHLORIDE 3 MILLILITER(S): 9 INJECTION, SOLUTION INTRAMUSCULAR; INTRAVENOUS; SUBCUTANEOUS at 05:59

## 2024-11-02 RX ADMIN — SODIUM CHLORIDE 3 MILLILITER(S): 9 INJECTION, SOLUTION INTRAMUSCULAR; INTRAVENOUS; SUBCUTANEOUS at 01:56

## 2024-11-02 RX ADMIN — Medication 1 APPLICATION(S): at 21:23

## 2024-11-02 RX ADMIN — Medication 600 MILLIGRAM(S): at 00:26

## 2024-11-02 RX ADMIN — SODIUM CHLORIDE 3 MILLILITER(S): 9 INJECTION, SOLUTION INTRAMUSCULAR; INTRAVENOUS; SUBCUTANEOUS at 13:49

## 2024-11-02 RX ADMIN — Medication 50 GM/HR: at 07:16

## 2024-11-02 RX ADMIN — Medication 975 MILLIGRAM(S): at 10:22

## 2024-11-02 RX ADMIN — Medication 1 APPLICATION(S): at 21:24

## 2024-11-02 RX ADMIN — BENZOCAINE 1 SPRAY(S): 200 GEL ORAL at 21:23

## 2024-11-02 RX ADMIN — MAGNESIUM HYDROXIDE 30 MILLILITER(S): 1200 SUSPENSION ORAL at 10:22

## 2024-11-02 RX ADMIN — Medication 975 MILLIGRAM(S): at 10:52

## 2024-11-02 RX ADMIN — Medication 975 MILLIGRAM(S): at 21:23

## 2024-11-02 RX ADMIN — Medication 975 MILLIGRAM(S): at 22:30

## 2024-11-02 RX ADMIN — GUAIFENESIN 600 MILLIGRAM(S): 100 LIQUID ORAL at 10:53

## 2024-11-02 NOTE — LACTATION INITIAL EVALUATION - INTERVENTION OUTCOME
Informed pt about Triple feeding due to infant 35.3, Encouraged to breastfeed the baby on demand based on cues and at least 8-12 times in a day. Instructed to log feedings along with wet and dirty diapers. Breast pump assistance provided.  Reviewed collection of breast milk storage guidelines, along with proper cleaning instructions of pump apparatus. Stressed importance of pumping to relieve engorement, and wearing a supportive bra. Encouraged increased breastfeeding/pumping prn, warm showers Pumping guidelines reinforced. Verbalized understanding to call for assistance prn. Instructed in hand expression with good return demonstration. + colostrum collected into Colostrum , Syringes given, Informed how a  infant Feeding Pattern is.  Went over New beginning book. Safe skin to skin done. [place] Triple feeding form given to Pt. Reviewed proper positioning no matter what position she chooses to use: belly to belly, alignment, and supporting the head and shoulders. .Lots of motivation given.  Mother and Infant roomed-in.   Mother educated on safe skin to skin care, safe sleep practices and environment. Call bell within reach./verbalizes understanding/demonstrates understanding of teaching

## 2024-11-02 NOTE — PROGRESS NOTE ADULT - ASSESSMENT
A/P: 34yo PPD#1 s/p  c/b sPEC (BP) on Mg.  Patient is stable and doing well post-partum. VSS, AF.    #sPEC (BP)  - Magnesium for seizure prophylaxis, cont 24h postpartum; to d/c Mg at 630p tonight  - BP's overnight: 120-140s/70-90s  - Cont Procardia 30 XL qd  - S/p Labetalol 20mg ()  - HELLP labs wnl, P/C: 0.4  - F/u repeat AM HELLP labs  - cont to monitor for S/s sPEC  - d/c home with BP cuff    #Routine postpartum care  - Pain well controlled, continue current pain regimen  - Increase ambulation, SCDs when not ambulating  - Continue regular diet    Ragini Skinner PGY1 A/P: 34yo PPD#1 s/p  c/b sPEC (BP) on Mg and PTD@35.3w.  Patient is stable and doing well post-partum. VSS, AF.    #sPEC (BP)  - Magnesium for seizure prophylaxis, cont 24h postpartum; to d/c Mg at 630p tonight  - BP's overnight: 120-140s/70-90s  - Cont Procardia 30 XL qd  - S/p Labetalol 20mg ()  - HELLP labs wnl, P/C: 0.4  - F/u repeat AM HELLP labs  - cont to monitor for S/s sPEC  - d/c home with BP cuff    #Routine postpartum care  - Pain well controlled, continue current pain regimen  - Increase ambulation, SCDs when not ambulating  - Continue regular diet  - Declines postpartum contraception    Ragini Skinner PGY1

## 2024-11-02 NOTE — PROGRESS NOTE ADULT - SUBJECTIVE AND OBJECTIVE BOX
OB Progress Note:  PPD#1    S: 32yo PPD#1 s/p  c/b sPEC on Mg. Patient feels well. Pain is well controlled. She is tolerating a regular diet and passing flatus. She is voiding spontaneously, and ambulating without difficulty. Denies lightheadedness/dizziness. Denies N/V. Patient denies HA, CP, SOB, changes or spots in vision, new swelling in the hands and face, or RUQ/epigastric pain. Denies fever/chills.    O:  Vitals:  Vital Signs Last 24 Hrs  T(C): 37.1 (2024 07:50), Max: 37.1 (2024 07:50)  T(F): 98.8 (2024 07:50), Max: 98.8 (2024 07:50)  HR: 99 (2024 07:50) (82 - 132)  BP: 138/86 (2024 07:50) (115/53 - 170/70)  BP(mean): --  RR: 18 (2024 07:50) (15 - 18)  SpO2: 99% (2024 07:50) (73% - 100%)    Parameters below as of 2024 05:30  Patient On (Oxygen Delivery Method): room air        MEDICATIONS  (STANDING):  acetaminophen     Tablet .. 975 milliGRAM(s) Oral <User Schedule>  cetirizine 10 milliGRAM(s) Oral daily  chlorhexidine 2% Cloths 1 Application(s) Topical <User Schedule>  diphtheria/tetanus/pertussis (acellular) Vaccine (Adacel) 0.5 milliLiter(s) IntraMuscular once  ibuprofen  Tablet. 600 milliGRAM(s) Oral every 6 hours  influenza   Vaccine 0.5 milliLiter(s) IntraMuscular once  ketorolac   Injectable 30 milliGRAM(s) IV Push once  lactated ringers. 1000 milliLiter(s) (50 mL/Hr) IV Continuous <Continuous>  magnesium sulfate Infusion 2 Gm/Hr (50 mL/Hr) IV Continuous <Continuous>  metoclopramide Injectable 10 milliGRAM(s) IV Push once  NIFEdipine XL 30 milliGRAM(s) Oral daily  oxytocin Infusion 167 milliUNIT(s)/Min (167 mL/Hr) IV Continuous <Continuous>  oxytocin Infusion. 2 milliUNIT(s)/Min (2 mL/Hr) IV Continuous <Continuous>  prenatal multivitamin 1 Tablet(s) Oral daily  sodium chloride 0.9% lock flush 3 milliLiter(s) IV Push every 8 hours  sodium chloride 0.9% lock flush 3 milliLiter(s) IV Push every 8 hours      Labs:  Blood type: O Positive  Rubella IgG: RPR: Negative                          13.1   12.85[H] >-----------< 238    (  19:30 )             38.2                        12.5   9.25 >-----------< 244    ( :26 )             36.3                        12.9   9.04 >-----------< 223    ( 10-31 @ 10:30 )             37.3                        12.1   9.22 >-----------< 218    ( 10-31 @ 09:36 )             35.6                        11.8   8.81 >-----------< 225    ( 10-30 @ 19:21 )             35.9    24 @ 19:30      132[L]  |  97[L]  |  7   ----------------------------<  109[H]  4.1   |  19[L]  |  0.75    24:26      133[L]  |  100  |  6[L]  ----------------------------<  77  3.8   |  19[L]  |  0.63    10-31-24 @ 10:30      135  |  106  |  4[L]  ----------------------------<  78  4.1   |  17[L]  |  0.61    10-31-24 @ 09:36      138  |  104  |  5[L]  ----------------------------<  52[LL]  3.9   |  19[L]  |  0.65    10-30-24 @ 19:21      137  |  106  |  4[L]  ----------------------------<  79  4.0   |  22  |  0.67        Ca    7.1[L]      2024 19:30  Ca    9.2      2024 01:26  Ca    8.8      31 Oct 2024 10:30  Ca    8.8      31 Oct 2024 09:36  Ca    8.8      30 Oct 2024 19:21  Mg     6.20[H]       Mg     6.80[H]       Mg     6.50[H]       Mg     5.40[H]         TPro  6.3  /  Alb  3.3  /  TBili  0.4  /  DBili  x   /  AST  24  /  ALT  18  /  AlkPhos  132[H]  24 @ 19:30  TPro  6.7  /  Alb  3.4  /  TBili  0.4  /  DBili  x   /  AST  24  /  ALT  20  /  AlkPhos  125[H]  24 @ 01:26  TPro  6.6  /  Alb  3.2[L]  /  TBili  0.5  /  DBili  x   /  AST  40[H]  /  ALT  18  /  AlkPhos  117  10-31-24 @ 10:30  TPro  6.4  /  Alb  3.2[L]  /  TBili  0.4  /  DBili  x   /  AST  22  /  ALT  16  /  AlkPhos  114  10-31-24 @ 09:36  TPro  6.5  /  Alb  3.4  /  TBili  0.4  /  DBili  x   /  AST  23  /  ALT  19  /  AlkPhos  121[H]  10-30-24 @ 19:21          Physical Exam:  General: NAD  Abdomen: soft, non-tender, non-distended, fundus firm  Vaginal: Lochia wnl  Extremities: No erythema/edema OB Progress Note:  PPD#1    S: 32yo PPD#1 s/p  c/b sPEC on Mg and PTD@35.3w. Patient feels well. Pain is well controlled. She is tolerating a regular diet and passing flatus. She is voiding spontaneously, and ambulating without difficulty. Denies lightheadedness/dizziness. Denies N/V. Patient denies HA, CP, SOB, changes or spots in vision, new swelling in the hands and face, or RUQ/epigastric pain. Denies fever/chills.    O:  Vitals:  Vital Signs Last 24 Hrs  T(C): 37.1 (2024 07:50), Max: 37.1 (2024 07:50)  T(F): 98.8 (2024 07:50), Max: 98.8 (2024 07:50)  HR: 99 (2024 07:50) (82 - 132)  BP: 138/86 (2024 07:50) (115/53 - 170/70)  BP(mean): --  RR: 18 (2024 07:50) (15 - 18)  SpO2: 99% (2024 07:50) (73% - 100%)    Parameters below as of 2024 05:30  Patient On (Oxygen Delivery Method): room air        MEDICATIONS  (STANDING):  acetaminophen     Tablet .. 975 milliGRAM(s) Oral <User Schedule>  cetirizine 10 milliGRAM(s) Oral daily  chlorhexidine 2% Cloths 1 Application(s) Topical <User Schedule>  diphtheria/tetanus/pertussis (acellular) Vaccine (Adacel) 0.5 milliLiter(s) IntraMuscular once  ibuprofen  Tablet. 600 milliGRAM(s) Oral every 6 hours  influenza   Vaccine 0.5 milliLiter(s) IntraMuscular once  ketorolac   Injectable 30 milliGRAM(s) IV Push once  lactated ringers. 1000 milliLiter(s) (50 mL/Hr) IV Continuous <Continuous>  magnesium sulfate Infusion 2 Gm/Hr (50 mL/Hr) IV Continuous <Continuous>  metoclopramide Injectable 10 milliGRAM(s) IV Push once  NIFEdipine XL 30 milliGRAM(s) Oral daily  oxytocin Infusion 167 milliUNIT(s)/Min (167 mL/Hr) IV Continuous <Continuous>  oxytocin Infusion. 2 milliUNIT(s)/Min (2 mL/Hr) IV Continuous <Continuous>  prenatal multivitamin 1 Tablet(s) Oral daily  sodium chloride 0.9% lock flush 3 milliLiter(s) IV Push every 8 hours  sodium chloride 0.9% lock flush 3 milliLiter(s) IV Push every 8 hours      Labs:  Blood type: O Positive  Rubella IgG: RPR: Negative                          13.1   12.85[H] >-----------< 238    (  19:30 )             38.2                        12.5   9.25 >-----------< 244    ( :26 )             36.3                        12.9   9.04 >-----------< 223    ( 10-31 @ 10:30 )             37.3                        12.1   9.22 >-----------< 218    ( 10-31 @ 09:36 )             35.6                        11.8   8.81 >-----------< 225    ( 10-30 @ 19:21 )             35.9    24 @ 19:30      132[L]  |  97[L]  |  7   ----------------------------<  109[H]  4.1   |  19[L]  |  0.75    24:26      133[L]  |  100  |  6[L]  ----------------------------<  77  3.8   |  19[L]  |  0.63    10-31-24 @ 10:30      135  |  106  |  4[L]  ----------------------------<  78  4.1   |  17[L]  |  0.61    10-31-24 @ 09:36      138  |  104  |  5[L]  ----------------------------<  52[LL]  3.9   |  19[L]  |  0.65    10-30-24 @ 19:21      137  |  106  |  4[L]  ----------------------------<  79  4.0   |  22  |  0.67        Ca    7.1[L]      2024 19:30  Ca    9.2      2024 01:26  Ca    8.8      31 Oct 2024 10:30  Ca    8.8      31 Oct 2024 09:36  Ca    8.8      30 Oct 2024 19:21  Mg     6.20[H]       Mg     6.80[H]       Mg     6.50[H]       Mg     5.40[H]         TPro  6.3  /  Alb  3.3  /  TBili  0.4  /  DBili  x   /  AST  24  /  ALT  18  /  AlkPhos  132[H]  24 @ 19:30  TPro  6.7  /  Alb  3.4  /  TBili  0.4  /  DBili  x   /  AST  24  /  ALT  20  /  AlkPhos  125[H]  24 @ 01:26  TPro  6.6  /  Alb  3.2[L]  /  TBili  0.5  /  DBili  x   /  AST  40[H]  /  ALT  18  /  AlkPhos  117  10-31-24 @ 10:30  TPro  6.4  /  Alb  3.2[L]  /  TBili  0.4  /  DBili  x   /  AST  22  /  ALT  16  /  AlkPhos  114  10-31-24 @ 09:36  TPro  6.5  /  Alb  3.4  /  TBili  0.4  /  DBili  x   /  AST  23  /  ALT  19  /  AlkPhos  121[H]  10-30-24 @ 19:21          Physical Exam:  General: NAD  Abdomen: soft, non-tender, non-distended, fundus firm  Vaginal: Lochia wnl  Extremities: No erythema/edema

## 2024-11-03 VITALS
SYSTOLIC BLOOD PRESSURE: 139 MMHG | OXYGEN SATURATION: 100 % | TEMPERATURE: 98 F | HEART RATE: 92 BPM | RESPIRATION RATE: 18 BRPM | DIASTOLIC BLOOD PRESSURE: 88 MMHG

## 2024-11-03 RX ORDER — MODIFIED LANOLIN
1 OINTMENT (GRAM) TOPICAL
Qty: 0 | Refills: 0 | DISCHARGE
Start: 2024-11-03

## 2024-11-03 RX ORDER — NIFEDIPINE 90 MG
1 TABLET, EXTENDED RELEASE 24 HR ORAL
Qty: 30 | Refills: 6
Start: 2024-11-03

## 2024-11-03 RX ORDER — ACETAMINOPHEN 500 MG
3 TABLET ORAL
Qty: 0 | Refills: 0 | DISCHARGE
Start: 2024-11-03

## 2024-11-03 RX ORDER — NIFEDIPINE 90 MG
1 TABLET, EXTENDED RELEASE 24 HR ORAL
Qty: 0 | Refills: 0 | DISCHARGE
Start: 2024-11-03

## 2024-11-03 RX ORDER — IBUPROFEN 200 MG
1 TABLET ORAL
Qty: 0 | Refills: 0 | DISCHARGE
Start: 2024-11-03

## 2024-11-03 RX ADMIN — Medication 30 MILLIGRAM(S): at 01:01

## 2024-11-03 RX ADMIN — Medication 600 MILLIGRAM(S): at 01:30

## 2024-11-03 RX ADMIN — SODIUM CHLORIDE 3 MILLILITER(S): 9 INJECTION, SOLUTION INTRAMUSCULAR; INTRAVENOUS; SUBCUTANEOUS at 05:12

## 2024-11-03 RX ADMIN — GUAIFENESIN 600 MILLIGRAM(S): 100 LIQUID ORAL at 01:01

## 2024-11-03 RX ADMIN — CETIRIZINE HCL 10 MILLIGRAM(S): 10 TABLET ORAL at 01:01

## 2024-11-03 RX ADMIN — Medication 600 MILLIGRAM(S): at 01:01

## 2024-11-03 NOTE — DISCHARGE NOTE OB - PAIN IN THE CALVES OF YOUR LEGS
Aklief Pregnancy And Lactation Text: It is unknown if this medication is safe to use during pregnancy.  It is unknown if this medication is excreted in breast milk.  Breastfeeding women should use the topical cream on the smallest area of the skin for the shortest time needed while breastfeeding.  Do not apply to nipple and areola. Statement Selected

## 2024-11-03 NOTE — DISCHARGE NOTE OB - PLAN OF CARE
Due to diagnosis of preeclampsia with severe features, please return for OBGYN visit for blood pressure check within 48-72 hours of discharge. At home, please measure blood pressures three times a day. Call OBGYN if pressures are above 140/90 and/or when exhibiting signs or symptoms of pre-eclampsia such as headache, vision changes, difficulty breathing, right quadrant abdominal pain or any concerns. After discharge, please stay on pelvic rest for 6 weeks, meaning no sexual intercourse, no tampons and no douching.  No driving for 2 weeks as women can loose a lot of blood during delivery and there is a possibility of being lightheaded/fainting.  No lifting objects heavier than baby for two weeks.  Expect to have vaginal bleeding/spotting for up to six weeks.  The bleeding should get lighter and more white/light brown with time.  For bleeding soaking more than a pad an hour or passing clots greater than the size of your fist, come in to the emergency department.    Follow up in clinic in 6 weeks.

## 2024-11-03 NOTE — DISCHARGE NOTE OB - REASON FOR ADMISSION
Induction of labor for preeclampsia without severe features (then meeting criteria for severe features)

## 2024-11-03 NOTE — DISCHARGE NOTE OB - PATIENT PORTAL LINK FT
You can access the FollowMyHealth Patient Portal offered by [facility] by registering at the following website: [email or web address]. By joining [facility]’s FollowMyHealth portal, you will also be able to view your health information using other applications (apps) compatible with our system.

## 2024-11-03 NOTE — DISCHARGE NOTE OB - CARE PLAN
1 Principal Discharge DX:	 (normal spontaneous vaginal delivery)  Assessment and plan of treatment:	After discharge, please stay on pelvic rest for 6 weeks, meaning no sexual intercourse, no tampons and no douching.  No driving for 2 weeks as women can loose a lot of blood during delivery and there is a possibility of being lightheaded/fainting.  No lifting objects heavier than baby for two weeks.  Expect to have vaginal bleeding/spotting for up to six weeks.  The bleeding should get lighter and more white/light brown with time.  For bleeding soaking more than a pad an hour or passing clots greater than the size of your fist, come in to the emergency department.    Follow up in clinic in 6 weeks.  Secondary Diagnosis:	Preeclampsia, severe  Assessment and plan of treatment:	Due to diagnosis of preeclampsia with severe features, please return for OBGYN visit for blood pressure check within 48-72 hours of discharge. At home, please measure blood pressures three times a day. Call OBGYN if pressures are above 140/90 and/or when exhibiting signs or symptoms of pre-eclampsia such as headache, vision changes, difficulty breathing, right quadrant abdominal pain or any concerns.

## 2024-11-03 NOTE — DISCHARGE NOTE OB - PROVIDER TOKENS
FREE:[LAST:[[facility]],FIRST:[Oncology Building, Basement],PHONE:[phone]],FAX:[(   [phone]    -],ADDRESS:[[phone] [place] [id number]]]

## 2024-11-03 NOTE — DISCHARGE NOTE OB - CAREGIVER NAME
Giovanni Suarez 66/F from home lives with boyfriend who help her woth chores and grocery, with PMH 80 pack year smoking hx quit since 8.5 years , R lung cancer s/p RLL resection 2017 on chemo, pathological fracture of spine and rib presented with worsening shortness or breath, pain over fracture site and constipation.     Vital Signs Last 24 Hrs  T(C): 36.9 (02 Aug 2020 11:17), Max: 36.9 (02 Aug 2020 11:17)  T(F): 98.5 (02 Aug 2020 11:17), Max: 98.5 (02 Aug 2020 11:17)  HR: 83 (02 Aug 2020 11:17) (77 - 83)  BP: 146/79 (02 Aug 2020 11:17) (125/73 - 151/72)  BP(mean): --  RR: 18 (02 Aug 2020 11:17) (16 - 18)  SpO2: 97% (02 Aug 2020 11:17) (97% - 100%)                            12.9   38.11 )-----------( 138      ( 02 Aug 2020 06:29 )             40.2       08-02    140  |  103  |  18  ----------------------------<  213<H>  3.2<L>   |  29  |  0.80    Ca    8.4      02 Aug 2020 06:29    TPro  6.3  /  Alb  2.8<L>  /  TBili  1.0  /  DBili  x   /  AST  13  /  ALT  26  /  AlkPhos  157<H>  08-02              Urinalysis Basic - ( 02 Aug 2020 11:39 )    Color: Yellow / Appearance: Clear / S.020 / pH: x  Gluc: x / Ketone: Negative  / Bili: Negative / Urobili: 1   Blood: x / Protein: 30 mg/dL / Nitrite: Negative   Leuk Esterase: Small / RBC: 2-5 /HPF / WBC 6-10 /HPF   Sq Epi: x / Non Sq Epi: Few /HPF / Bacteria: Few /HPF        PT/INR - ( 02 Aug 2020 06:42 )   PT: 13.1 sec;   INR: 1.13 ratio         PTT - ( 02 Aug 2020 06:42 )  PTT:25.4 sec    Lactate Trend   @ 15:04 Lactate:1.9   08-02 @ 08:05 Lactate:2.2       CARDIAC MARKERS ( 02 Aug 2020 06:29 )  <0.015 ng/mL / x     / 34 U/L / x     / x        A/P:   Acute hypoxic respiratory failure 2/2 COPD exacerbation  - with history of heavy smoking and lung cancer s/p resection/chemo   - VBG compensated   - wheezing +   - failed trial of oral steroids at home   - will start on iv steroids, bronchodilators   - supplemental O2 to maintain SO2 above 90-92  - case management consult for increasing need for O2   - levoflox for exacerbation   - pulmonary rehab on discharge   - Pulmonary Dr. Nugent    Pathological fracture of spine and ribs:  - sclerotic lesion on CT chest   - continue pain control with bowel regimen     Metastatic lung cancer s/p resection and current chemo:  - last chemo last week   - Onc Dr. Mendiola   Constipation:  reported last BM above 8 days ago   - s/p regimen in ed - had 1 BM   - continue regimen    DM - follow HbA1c level sliding scale   HTN- continue home meds with parameters   DVT and GI prophylaxis

## 2024-11-03 NOTE — DISCHARGE NOTE OB - CARE PROVIDER_API CALL
[facility], Oncology Building, [facility]  [id number] [address]  Phone: [phone]  Fax: (   )    -  Follow Up Time:

## 2024-11-03 NOTE — DISCHARGE NOTE OB - HOSPITAL COURSE
Patient had an uncomplicated  followed by a postpartum course c/b preeclampsia with severe features s/p Labetalol 20mg () and Magnesium sulfate (-) and now on [id number] qd. BPs have been moderately controlled 120-140s/60-80s during postpartum course. , H/H 11.8/35.6->12.1/35.6->12.9/37.3->12.5/36.3->13.1/38.2->11.8/34.6. On postpartum day 2, patient was discharged home in stable condition, voiding spontaneously and with normal vital signs. Patient understands to return to clinic in 2-3 days for BP check.

## 2024-11-03 NOTE — DISCHARGE NOTE OB - ADDITIONAL INSTRUCTIONS
Follow up with your outpatient provider 1-3 days after discharge for a blood pressure check.  Follow up with your outpatient provider in 4-6 weeks for routine postpartum care.

## 2024-11-03 NOTE — PROGRESS NOTE ADULT - ASSESSMENT
A/P: 34yo PPD#2 s/p  c/b sPEC (BP) on Mg and PTD@35.3w.  Patient is stable and doing well post-partum.  VSS, AF.    #sPEC (BP)  - S/p Magnesium for seizure prophylaxis (-)  - BP's overnight: 131-143s/70-80s  - Cont Procardia 30 XL qd  - S/p Labetalol 20mg ()  - HELLP labs wnl x2, P/C: 0.4  - cont to monitor for S/s sPEC  - d/c home with BP cuff    #Routine postpartum care  - Pain well controlled, continue current pain regimen  - Increase ambulation, SCDs when not ambulating  - Continue regular diet  - Plan to discharge today  - Declines postpartum contraception    Ragini Skinner PGY1 A/P: 34yo PPD#2 s/p  c/b sPEC (BP) s/p Mg and PTD@35.3w.  Patient is stable and doing well post-partum.  VSS, AF.    #sPEC (BP)  - S/p Magnesium for seizure prophylaxis (-)  - BP's overnight: 131-143s/70-80s  - Cont Procardia 30 XL qd  - S/p Labetalol 20mg ()  - HELLP labs wnl x2, P/C: 0.4  - cont to monitor for S/s sPEC  - d/c home with BP cuff    #Routine postpartum care  - Pain well controlled, continue current pain regimen  - Increase ambulation, SCDs when not ambulating  - Continue regular diet  - Plan to discharge today  - Declines postpartum contraception    Ragini Skinner PGY1

## 2024-11-03 NOTE — PROGRESS NOTE ADULT - SUBJECTIVE AND OBJECTIVE BOX
OB Progress Note:  PPD#2    S: 34yo PPD#2 s/p  c/b sPEC on Mg and PTD@35.3w. Patient feels well. Pain is well controlled. She is tolerating a regular diet and passing flatus. She is voiding spontaneously, and ambulating without difficulty. Denies CP/SOB. Denies lightheadedness/dizziness. Denies N/V. Denies fever/chills. Desires discharge today.    O:  Vitals:   Vital Signs Last 24 Hrs  T(C): 37.1 (2024 06:00), Max: 37.1 (2024 06:00)  T(F): 98.7 (2024 06:00), Max: 98.7 (2024 06:00)  HR: 96 (2024 06:00) (88 - 100)  BP: 134/67 (2024 06:00) (126/67 - 143/87)  BP(mean): --  RR: 18 (2024 06:00) (17 - 18)  SpO2: 100% (2024 06:00) (97% - 100%)    Parameters below as of 2024 22:45  Patient On (Oxygen Delivery Method): room air        MEDICATIONS  (STANDING):  acetaminophen     Tablet .. 975 milliGRAM(s) Oral <User Schedule>  cetirizine 10 milliGRAM(s) Oral daily  chlorhexidine 2% Cloths 1 Application(s) Topical <User Schedule>  diphtheria/tetanus/pertussis (acellular) Vaccine (Adacel) 0.5 milliLiter(s) IntraMuscular once  guaiFENesin  milliGRAM(s) Oral every 12 hours  ibuprofen  Tablet. 600 milliGRAM(s) Oral every 6 hours  lactated ringers. 1000 milliLiter(s) (50 mL/Hr) IV Continuous <Continuous>  NIFEdipine XL 30 milliGRAM(s) Oral daily  oxytocin Infusion 167 milliUNIT(s)/Min (167 mL/Hr) IV Continuous <Continuous>  oxytocin Infusion. 2 milliUNIT(s)/Min (2 mL/Hr) IV Continuous <Continuous>  prenatal multivitamin 1 Tablet(s) Oral daily  sodium chloride 0.9% lock flush 3 milliLiter(s) IV Push every 8 hours  sodium chloride 0.9% lock flush 3 milliLiter(s) IV Push every 8 hours    MEDICATIONS  (PRN):  benzocaine 20%/menthol 0.5% Spray 1 Spray(s) Topical every 6 hours PRN for Perineal discomfort  dibucaine 1% Ointment 1 Application(s) Topical every 6 hours PRN Perineal discomfort  diphenhydrAMINE 25 milliGRAM(s) Oral every 6 hours PRN Pruritus  hydrocortisone 1% Cream 1 Application(s) Topical every 6 hours PRN Moderate Pain (4-6)  lanolin Ointment 1 Application(s) Topical every 6 hours PRN nipple soreness  magnesium hydroxide Suspension 30 milliLiter(s) Oral two times a day PRN Constipation  oxyCODONE    IR 5 milliGRAM(s) Oral every 3 hours PRN Moderate to Severe Pain (4-10)  oxyCODONE    IR 5 milliGRAM(s) Oral once PRN Moderate to Severe Pain (4-10)  pramoxine 1%/zinc 5% Cream 1 Application(s) Topical every 4 hours PRN Moderate Pain (4-6)  simethicone 80 milliGRAM(s) Chew every 4 hours PRN Gas  witch hazel Pads 1 Application(s) Topical every 4 hours PRN Perineal discomfort      Labs:  Blood type: O Positive  Rubella IgG: RPR: Negative                          11.8   12.72[H] >-----------< 233    (  @ 08:25 )             34.6                        13.1   12.85[H] >-----------< 238    (  @ 19:30 )             38.2                        12.5   9.25 >-----------< 244    (  @ 01:26 )             36.3                        12.9   9.04 >-----------< 223    ( 10-31 @ 10:30 )             37.3                        12.1   9.22 >-----------< 218    ( 10-31 @ 09:36 )             35.6    24 @ 08:25      137  |  103  |  4[L]  ----------------------------<  98  3.9   |  23  |  0.63    24 @ 19:30      132[L]  |  97[L]  |  7   ----------------------------<  109[H]  4.1   |  19[L]  |  0.75    24 @ 01:26      133[L]  |  100  |  6[L]  ----------------------------<  77  3.8   |  19[L]  |  0.63    10-31-24 @ 10:30      135  |  106  |  4[L]  ----------------------------<  78  4.1   |  17[L]  |  0.61    10-31-24 @ 09:36      138  |  104  |  5[L]  ----------------------------<  52[LL]  3.9   |  19[L]  |  0.65        Ca    6.6[L]      2024 08:25  Ca    7.1[L]      2024 19:30  Ca    9.2      2024 01:26  Ca    8.8      31 Oct 2024 10:30  Ca    8.8      31 Oct 2024 09:36  Mg     6.00[H]     11-02  Mg     5.60[H]     11-02  Mg     6.10[H]     11-02  Mg     6.20[H]     11-02  Mg     6.80[H]     11-01  Mg     6.50[H]     11-01  Mg     5.40[H]         TPro  6.2  /  Alb  3.0[L]  /  TBili  0.5  /  DBili  x   /  AST  25  /  ALT  18  /  AlkPhos  115  24 @ 08:25  TPro  6.3  /  Alb  3.3  /  TBili  0.4  /  DBili  x   /  AST  24  /  ALT  18  /  AlkPhos  132[H]  24 @ 19:30  TPro  6.7  /  Alb  3.4  /  TBili  0.4  /  DBili  x   /  AST  24  /  ALT  20  /  AlkPhos  125[H]  24 @ 01:26  TPro  6.6  /  Alb  3.2[L]  /  TBili  0.5  /  DBili  x   /  AST  40[H]  /  ALT  18  /  AlkPhos  117  10-31-24 @ 10:30  TPro  6.4  /  Alb  3.2[L]  /  TBili  0.4  /  DBili  x   /  AST  22  /  ALT  16  /  AlkPhos  114  10-31-24 @ 09:36          Physical Exam:  General: NAD  Abdomen: soft, non-tender, non-distended, fundus firm  Vaginal: Lochia wnl  Extremities: No erythema/edema     OB Progress Note:  PPD#2    S: 34yo PPD#2 s/p  c/b sPEC s/p Mg and PTD@35.3w. Patient feels well. Pain is well controlled. She is tolerating a regular diet and passing flatus. She is voiding spontaneously, and ambulating without difficulty. Denies lightheadedness/dizziness. Denies N/V. Denies fever/chills. Desires discharge today. Patient denies HA, CP, SOB, changes or spots in vision, new swelling in the hands and face, or RUQ/epigastric pain.    O:  Vitals:   Vital Signs Last 24 Hrs  T(C): 37.1 (2024 06:00), Max: 37.1 (2024 06:00)  T(F): 98.7 (2024 06:00), Max: 98.7 (2024 06:00)  HR: 96 (2024 06:00) (88 - 100)  BP: 134/67 (2024 06:00) (126/67 - 143/87)  BP(mean): --  RR: 18 (2024 06:00) (17 - 18)  SpO2: 100% (2024 06:00) (97% - 100%)    Parameters below as of 2024 22:45  Patient On (Oxygen Delivery Method): room air        MEDICATIONS  (STANDING):  acetaminophen     Tablet .. 975 milliGRAM(s) Oral <User Schedule>  cetirizine 10 milliGRAM(s) Oral daily  chlorhexidine 2% Cloths 1 Application(s) Topical <User Schedule>  diphtheria/tetanus/pertussis (acellular) Vaccine (Adacel) 0.5 milliLiter(s) IntraMuscular once  guaiFENesin  milliGRAM(s) Oral every 12 hours  ibuprofen  Tablet. 600 milliGRAM(s) Oral every 6 hours  lactated ringers. 1000 milliLiter(s) (50 mL/Hr) IV Continuous <Continuous>  NIFEdipine XL 30 milliGRAM(s) Oral daily  oxytocin Infusion 167 milliUNIT(s)/Min (167 mL/Hr) IV Continuous <Continuous>  oxytocin Infusion. 2 milliUNIT(s)/Min (2 mL/Hr) IV Continuous <Continuous>  prenatal multivitamin 1 Tablet(s) Oral daily  sodium chloride 0.9% lock flush 3 milliLiter(s) IV Push every 8 hours  sodium chloride 0.9% lock flush 3 milliLiter(s) IV Push every 8 hours    MEDICATIONS  (PRN):  benzocaine 20%/menthol 0.5% Spray 1 Spray(s) Topical every 6 hours PRN for Perineal discomfort  dibucaine 1% Ointment 1 Application(s) Topical every 6 hours PRN Perineal discomfort  diphenhydrAMINE 25 milliGRAM(s) Oral every 6 hours PRN Pruritus  hydrocortisone 1% Cream 1 Application(s) Topical every 6 hours PRN Moderate Pain (4-6)  lanolin Ointment 1 Application(s) Topical every 6 hours PRN nipple soreness  magnesium hydroxide Suspension 30 milliLiter(s) Oral two times a day PRN Constipation  oxyCODONE    IR 5 milliGRAM(s) Oral every 3 hours PRN Moderate to Severe Pain (4-10)  oxyCODONE    IR 5 milliGRAM(s) Oral once PRN Moderate to Severe Pain (4-10)  pramoxine 1%/zinc 5% Cream 1 Application(s) Topical every 4 hours PRN Moderate Pain (4-6)  simethicone 80 milliGRAM(s) Chew every 4 hours PRN Gas  witch hazel Pads 1 Application(s) Topical every 4 hours PRN Perineal discomfort      Labs:  Blood type: O Positive  Rubella IgG: RPR: Negative                          11.8   12.72[H] >-----------< 233    (  @ 08:25 )             34.6                        13.1   12.85[H] >-----------< 238    (  @ 19:30 )             38.2                        12.5   9.25 >-----------< 244    (  @ 01:26 )             36.3                        12.9   9.04 >-----------< 223    ( 10-31 @ 10:30 )             37.3                        12.1   9.22 >-----------< 218    ( 10-31 @ 09:36 )             35.6    24 @ 08:25      137  |  103  |  4[L]  ----------------------------<  98  3.9   |  23  |  0.63    24 @ 19:30      132[L]  |  97[L]  |  7   ----------------------------<  109[H]  4.1   |  19[L]  |  0.75    24 @ 01:26      133[L]  |  100  |  6[L]  ----------------------------<  77  3.8   |  19[L]  |  0.63    10-31-24 @ 10:30      135  |  106  |  4[L]  ----------------------------<  78  4.1   |  17[L]  |  0.61    10-31-24 @ 09:36      138  |  104  |  5[L]  ----------------------------<  52[LL]  3.9   |  19[L]  |  0.65        Ca    6.6[L]      2024 08:25  Ca    7.1[L]      2024 19:30  Ca    9.2      2024 01:26  Ca    8.8      31 Oct 2024 10:30  Ca    8.8      31 Oct 2024 09:36  Mg     6.00[H]     11-02  Mg     5.60[H]     11-02  Mg     6.10[H]     11-02  Mg     6.20[H]     11-02  Mg     6.80[H]     11-01  Mg     6.50[H]     11-01  Mg     5.40[H]         TPro  6.2  /  Alb  3.0[L]  /  TBili  0.5  /  DBili  x   /  AST  25  /  ALT  18  /  AlkPhos  115  24 @ 08:25  TPro  6.3  /  Alb  3.3  /  TBili  0.4  /  DBili  x   /  AST  24  /  ALT  18  /  AlkPhos  132[H]  24 @ 19:30  TPro  6.7  /  Alb  3.4  /  TBili  0.4  /  DBili  x   /  AST  24  /  ALT  20  /  AlkPhos  125[H]  24 @ 01:26  TPro  6.6  /  Alb  3.2[L]  /  TBili  0.5  /  DBili  x   /  AST  40[H]  /  ALT  18  /  AlkPhos  117  10-31-24 @ 10:30  TPro  6.4  /  Alb  3.2[L]  /  TBili  0.4  /  DBili  x   /  AST  22  /  ALT  16  /  AlkPhos  114  10-31-24 @ 09:36          Physical Exam:  General: NAD  Abdomen: soft, non-tender, non-distended, fundus firm  Vaginal: Lochia wnl  Extremities: No erythema/edema

## 2024-11-03 NOTE — PROGRESS NOTE ADULT - ATTENDING COMMENTS
Attending Addendum:   Agree with note above.   Patient is a 34 yo  at 35w2d admitted for PEC without SF. Patient given a dose of Nifed 30 XL overnight (8 pm last night) which has since been d/c'd. She presented with mild range BPs with UPC of 0.4, making the diagnosis. Labs otherwise normal. Patient with a moderate HA this am which was much improved after she ate. Plan as follows:   - Continue to monitor at least until tomorrow morning. Patient had a severe range this morning as well which was not sustained and thus another severe range BP > 4hrs from that would meet severe criteria and warrant delivery (fetus vertex).   - Fetus normally grown, EFW from 10/24 at 31%ile.   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      SHAKIR Qiu MD   MFM Attending
Pt seen and examined.  Pt denies HA, CP, SOB, calf pain, fevers/chills.  Pt denies HA, visual changes, RUQ/epigastric pain, N/V.   Pt states she feels drowsy on magnesium sulfate.  Pt tolerating regular diet.  Pt reports good pain control with PO pain meds.  Pt reports moderate lochia.     ICU Vital Signs Last 24 Hrs  T(C): 37 (02 Nov 2024 11:50), Max: 37.1 (02 Nov 2024 07:50)  T(F): 98.6 (02 Nov 2024 11:50), Max: 98.8 (02 Nov 2024 07:50)  HR: 100 (02 Nov 2024 14:00) (82 - 127)  BP: 126/67 (02 Nov 2024 14:00) (115/53 - 170/70)  BP(mean): --  ABP: --  ABP(mean): --  RR: 18 (02 Nov 2024 14:00) (15 - 18)  SpO2: 99% (02 Nov 2024 14:00) (73% - 100%)    O2 Parameters below as of 02 Nov 2024 05:30  Patient On (Oxygen Delivery Method): room air    General: NAD  Abd: fundus firm nontender  Ext: no calf tenderness    PPD#1 sp VD complicated by preeclampsia with severe features.   1.  Preeclampsia - Continue Magnesium sulfate until 24 hrs after delivery.  No si/sx of magnesium toxicity.  Urine output adequate.  BP's within goal range on current regimen of Procardia 30mg XL, pt asymptomatic, labs wnl.  Will continue to monitor BPs  2.  Continue routine pp care    Aimee Vaughn MD
Pt seen and examined.  Pt denies HA, CP, SOB, calf pain, fevers/chills.  Pt denies HA, visual changes, RUQ/epigastric pain, N/V.  Pt tolerating regular diet. + flatus/ -BM.  Pt reports good pain control with PO pain meds.  Pt voiding and ambulating without difficulty.  Pt reports decreasing lochia.     ICU Vital Signs Last 24 Hrs  T(C): 36.8 (03 Nov 2024 13:57), Max: 37.1 (03 Nov 2024 06:00)  T(F): 98.2 (03 Nov 2024 13:57), Max: 98.7 (03 Nov 2024 06:00)  HR: 91 (03 Nov 2024 13:57) (81 - 100)  BP: 136/88 (03 Nov 2024 13:57) (114/70 - 143/87)  BP(mean): --  ABP: --  ABP(mean): --  RR: 18 (03 Nov 2024 13:57) (17 - 18)  SpO2: 100% (03 Nov 2024 13:57) (97% - 100%)    O2 Parameters below as of 03 Nov 2024 13:57  Patient On (Oxygen Delivery Method): room air    General: NAD  Abd: fundus firm nontender  Ext: no calf tenderness    PPD#2 sp VD complicated by preeclampsia with severe features.   1.  Preeclampsia with severe features- s/p magnesium sulfate for seizure prophylaxis.  BP's within goal range on current regimen of Procardia 30mg XL, pt asymptomatic, labs wnl.  Discussed BP parameters, when to call and when to return to the hospital.  Discussed si/sx to monitor for.  Pt states she already has a follow up appointment for this Tuesday 11/5 at [facility] clinic.   2.  Pt cleared for discharge home today    Aimee Vaughn MD

## 2024-11-03 NOTE — DISCHARGE NOTE OB - FINANCIAL ASSISTANCE
[facility] provides services at a reduced cost to those who are determined to be eligible through [facility]’s financial assistance program. Information regarding [facility]’s financial assistance program can be found by going to [email or web address] or by calling [phone].

## 2024-11-03 NOTE — DISCHARGE NOTE OB - MEDICATION SUMMARY - MEDICATIONS TO TAKE
I will START or STAY ON the medications listed below when I get home from the hospital:    ibuprofen 600 mg oral tablet  -- 1 tab(s) by mouth every 6 hours  -- Indication: For Pain    acetaminophen 325 mg oral tablet  -- 3 tab(s) by mouth every 6 hours  -- Indication: For Pain    NIFEdipine 30 mg oral tablet, extended release  -- 1 tab(s) by mouth once a day  -- Indication: For blood pressure control    lanolin topical ointment  -- 1 Apply on skin to affected area every 6 hours As needed nipple soreness  -- Indication: For nipple pain    witch hazel 50% topical pad  -- 1 Apply on skin to affected area every 4 hours As needed Perineal discomfort  -- Indication: For Perineal pain   I will START or STAY ON the medications listed below when I get home from the hospital:    Blood Pressure Cuff  -- Please measure your blood pressure twice daily, once in the morning and once in the evening. Call and go to the ED if the top number is >160 or the bottom number is >110.  -- Indication: For Blood pressure monitoring    ibuprofen 600 mg oral tablet  -- 1 tab(s) by mouth every 6 hours  -- Indication: For Pain    acetaminophen 325 mg oral tablet  -- 3 tab(s) by mouth every 6 hours  -- Indication: For Pain    lanolin topical ointment  -- 1 Apply on skin to affected area every 6 hours As needed nipple soreness  -- Indication: For nipple pain    witch hazel 50% topical pad  -- 1 Apply on skin to affected area every 4 hours As needed Perineal discomfort  -- Indication: For Perineal pain

## 2024-11-05 ENCOUNTER — OUTPATIENT (OUTPATIENT)
Dept: OUTPATIENT SERVICES | Facility: HOSPITAL | Age: 34
LOS: 1 days | End: 2024-11-05

## 2024-11-05 ENCOUNTER — APPOINTMENT (OUTPATIENT)
Dept: OBGYN | Facility: HOSPITAL | Age: 34
End: 2024-11-05
Payer: MEDICAID

## 2024-11-05 VITALS
HEIGHT: 63 IN | DIASTOLIC BLOOD PRESSURE: 90 MMHG | WEIGHT: 150 LBS | SYSTOLIC BLOOD PRESSURE: 130 MMHG | TEMPERATURE: 97.9 F | HEART RATE: 97 BPM | BODY MASS INDEX: 26.58 KG/M2

## 2024-11-05 DIAGNOSIS — Z01.30 ENCOUNTER FOR EXAMINATION OF BLOOD PRESSURE W/OUT ABNORMAL FINDINGS: ICD-10-CM

## 2024-11-05 DIAGNOSIS — Z34.93 ENCOUNTER FOR SUPERVISION OF NORMAL PREGNANCY, UNSPECIFIED, THIRD TRIMESTER: ICD-10-CM

## 2024-11-05 PROBLEM — M50.20 OTHER CERVICAL DISC DISPLACEMENT, UNSPECIFIED CERVICAL REGION: Chronic | Status: ACTIVE | Noted: 2024-10-30

## 2024-11-05 PROBLEM — J45.909 UNSPECIFIED ASTHMA, UNCOMPLICATED: Chronic | Status: ACTIVE | Noted: 2024-10-30

## 2024-11-05 PROBLEM — M41.9 SCOLIOSIS, UNSPECIFIED: Chronic | Status: ACTIVE | Noted: 2024-10-30

## 2024-11-05 PROCEDURE — 99213 OFFICE O/P EST LOW 20 MIN: CPT

## 2024-11-05 PROCEDURE — 98960 EDU&TRN PT SELF-MGMT NQHP 1: CPT

## 2024-11-06 ENCOUNTER — APPOINTMENT (OUTPATIENT)
Dept: ORTHOPEDIC SURGERY | Facility: HOSPITAL | Age: 34
End: 2024-11-06

## 2024-11-06 DIAGNOSIS — Z01.30 ENCOUNTER FOR EXAMINATION OF BLOOD PRESSURE WITHOUT ABNORMAL FINDINGS: ICD-10-CM

## 2024-11-11 RX ORDER — NIFEDIPINE 90 MG
1 TABLET, EXTENDED RELEASE 24 HR ORAL
Qty: 30 | Refills: 6
Start: 2024-11-11 | End: 2025-06-08

## 2024-11-13 ENCOUNTER — NON-APPOINTMENT (OUTPATIENT)
Age: 34
End: 2024-11-13

## 2024-11-18 ENCOUNTER — NON-APPOINTMENT (OUTPATIENT)
Age: 34
End: 2024-11-18

## 2024-11-18 ENCOUNTER — APPOINTMENT (OUTPATIENT)
Dept: OBGYN | Facility: HOSPITAL | Age: 34
End: 2024-11-18

## 2024-11-18 VITALS — SYSTOLIC BLOOD PRESSURE: 120 MMHG | DIASTOLIC BLOOD PRESSURE: 80 MMHG

## 2024-11-18 VITALS — SYSTOLIC BLOOD PRESSURE: 118 MMHG | DIASTOLIC BLOOD PRESSURE: 70 MMHG

## 2024-11-20 ENCOUNTER — APPOINTMENT (OUTPATIENT)
Dept: OBGYN | Facility: HOSPITAL | Age: 34
End: 2024-11-20

## 2024-12-03 ENCOUNTER — APPOINTMENT (OUTPATIENT)
Dept: CARDIOLOGY | Facility: CLINIC | Age: 34
End: 2024-12-03

## 2024-12-10 ENCOUNTER — OUTPATIENT (OUTPATIENT)
Dept: OUTPATIENT SERVICES | Facility: HOSPITAL | Age: 34
LOS: 1 days | End: 2024-12-10

## 2024-12-10 ENCOUNTER — APPOINTMENT (OUTPATIENT)
Dept: OBGYN | Facility: HOSPITAL | Age: 34
End: 2024-12-10
Payer: MEDICAID

## 2024-12-10 VITALS
TEMPERATURE: 97.9 F | DIASTOLIC BLOOD PRESSURE: 74 MMHG | WEIGHT: 141 LBS | HEART RATE: 87 BPM | HEIGHT: 63 IN | SYSTOLIC BLOOD PRESSURE: 118 MMHG | BODY MASS INDEX: 24.98 KG/M2

## 2024-12-10 PROCEDURE — 99213 OFFICE O/P EST LOW 20 MIN: CPT

## 2025-01-14 ENCOUNTER — NON-APPOINTMENT (OUTPATIENT)
Age: 35
End: 2025-01-14

## 2025-01-15 ENCOUNTER — NON-APPOINTMENT (OUTPATIENT)
Age: 35
End: 2025-01-15

## 2025-05-16 NOTE — OB PROVIDER TRIAGE NOTE - NSPROVTRIAGESELHIDDEN_OBGYN_ALL_OB_FT
[[email or web address]_OBGYN_ALL_OB:[email or web address]=] Pt will perform bed mobility independently without use of bed rail within 30 days.

## 2025-05-30 ENCOUNTER — NON-APPOINTMENT (OUTPATIENT)
Age: 35
End: 2025-05-30

## 2025-08-13 ENCOUNTER — RESULT REVIEW (OUTPATIENT)
Age: 35
End: 2025-08-13

## 2025-08-13 ENCOUNTER — APPOINTMENT (OUTPATIENT)
Dept: OBGYN | Facility: HOSPITAL | Age: 35
End: 2025-08-13

## 2025-08-13 ENCOUNTER — OUTPATIENT (OUTPATIENT)
Dept: OUTPATIENT SERVICES | Facility: HOSPITAL | Age: 35
LOS: 1 days | End: 2025-08-13

## 2025-08-13 VITALS
SYSTOLIC BLOOD PRESSURE: 120 MMHG | HEIGHT: 63 IN | DIASTOLIC BLOOD PRESSURE: 76 MMHG | HEART RATE: 80 BPM | BODY MASS INDEX: 26.58 KG/M2 | TEMPERATURE: 97.9 F | WEIGHT: 150 LBS

## 2025-08-13 DIAGNOSIS — Z01.419 ENCOUNTER FOR GYNECOLOGICAL EXAMINATION (GENERAL) (ROUTINE) W/OUT ABNORMAL FINDINGS: ICD-10-CM

## 2025-08-13 DIAGNOSIS — N89.8 OTHER SPECIFIED NONINFLAMMATORY DISORDERS OF VAGINA: ICD-10-CM

## 2025-08-13 LAB
ALBUMIN SERPL ELPH-MCNC: 4.6 G/DL — SIGNIFICANT CHANGE UP (ref 3.3–5)
ALP SERPL-CCNC: 137 U/L — HIGH (ref 40–120)
ALT FLD-CCNC: 10 U/L — SIGNIFICANT CHANGE UP (ref 4–33)
ANION GAP SERPL CALC-SCNC: 12 MMOL/L — SIGNIFICANT CHANGE UP (ref 7–14)
AST SERPL-CCNC: 15 U/L — SIGNIFICANT CHANGE UP (ref 4–32)
BILIRUB SERPL-MCNC: 1 MG/DL — SIGNIFICANT CHANGE UP (ref 0.2–1.2)
BUN SERPL-MCNC: 10 MG/DL — SIGNIFICANT CHANGE UP (ref 7–23)
CALCIUM SERPL-MCNC: 9.7 MG/DL — SIGNIFICANT CHANGE UP (ref 8.4–10.5)
CHLORIDE SERPL-SCNC: 102 MMOL/L — SIGNIFICANT CHANGE UP (ref 98–107)
CO2 SERPL-SCNC: 23 MMOL/L — SIGNIFICANT CHANGE UP (ref 22–31)
CREAT SERPL-MCNC: 0.75 MG/DL — SIGNIFICANT CHANGE UP (ref 0.5–1.3)
EGFR: 108 ML/MIN/1.73M2 — SIGNIFICANT CHANGE UP
EGFR: 108 ML/MIN/1.73M2 — SIGNIFICANT CHANGE UP
GLUCOSE SERPL-MCNC: 78 MG/DL — SIGNIFICANT CHANGE UP (ref 70–99)
HCT VFR BLD CALC: 36.5 % — SIGNIFICANT CHANGE UP (ref 34.5–45)
HGB BLD-MCNC: 12.1 G/DL — SIGNIFICANT CHANGE UP (ref 11.5–15.5)
HIV 1+2 AB+HIV1 P24 AG SERPL QL IA: SIGNIFICANT CHANGE UP
MCHC RBC-ENTMCNC: 27.8 PG — SIGNIFICANT CHANGE UP (ref 27–34)
MCHC RBC-ENTMCNC: 33.2 G/DL — SIGNIFICANT CHANGE UP (ref 32–36)
MCV RBC AUTO: 83.9 FL — SIGNIFICANT CHANGE UP (ref 80–100)
NRBC # BLD AUTO: 0 K/UL — SIGNIFICANT CHANGE UP (ref 0–0)
NRBC # FLD: 0 K/UL — SIGNIFICANT CHANGE UP (ref 0–0)
NRBC BLD AUTO-RTO: 0 /100 WBCS — SIGNIFICANT CHANGE UP (ref 0–0)
PLATELET # BLD AUTO: 399 K/UL — SIGNIFICANT CHANGE UP (ref 150–400)
PMV BLD: 8.7 FL — SIGNIFICANT CHANGE UP (ref 7–13)
POTASSIUM SERPL-MCNC: 4.1 MMOL/L — SIGNIFICANT CHANGE UP (ref 3.5–5.3)
POTASSIUM SERPL-SCNC: 4.1 MMOL/L — SIGNIFICANT CHANGE UP (ref 3.5–5.3)
PROT SERPL-MCNC: 8.1 G/DL — SIGNIFICANT CHANGE UP (ref 6–8.3)
RBC # BLD: 4.35 M/UL — SIGNIFICANT CHANGE UP (ref 3.8–5.2)
RBC # FLD: 12.8 % — SIGNIFICANT CHANGE UP (ref 10.3–14.5)
SODIUM SERPL-SCNC: 137 MMOL/L — SIGNIFICANT CHANGE UP (ref 135–145)
WBC # BLD: 6.7 K/UL — SIGNIFICANT CHANGE UP (ref 3.8–10.5)
WBC # FLD AUTO: 6.7 K/UL — SIGNIFICANT CHANGE UP (ref 3.8–10.5)

## 2025-08-13 PROCEDURE — 99385 PREV VISIT NEW AGE 18-39: CPT

## 2025-08-13 RX ORDER — METRONIDAZOLE 500 MG/1
500 TABLET ORAL TWICE DAILY
Qty: 14 | Refills: 0 | Status: ACTIVE | COMMUNITY
Start: 2025-08-13 | End: 1900-01-01

## 2025-08-14 LAB
CANDIDA AB TITR SER: SIGNIFICANT CHANGE UP
G VAGINALIS DNA SPEC QL NAA+PROBE: DETECTED
HBV SURFACE AG SER-ACNC: SIGNIFICANT CHANGE UP
HCV AB S/CO SERPL IA: 0.15 S/CO — SIGNIFICANT CHANGE UP (ref 0–0.79)
HCV AB SERPL-IMP: SIGNIFICANT CHANGE UP
T PALLIDUM AB TITR SER: NEGATIVE — SIGNIFICANT CHANGE UP
T VAGINALIS SPEC QL WET PREP: SIGNIFICANT CHANGE UP

## 2025-08-15 DIAGNOSIS — N89.8 OTHER SPECIFIED NONINFLAMMATORY DISORDERS OF VAGINA: ICD-10-CM

## 2025-08-15 DIAGNOSIS — Z01.419 ENCOUNTER FOR GYNECOLOGICAL EXAMINATION (GENERAL) (ROUTINE) WITHOUT ABNORMAL FINDINGS: ICD-10-CM

## 2025-08-15 LAB
C TRACH RRNA SPEC QL NAA+PROBE: SIGNIFICANT CHANGE UP
N GONORRHOEA RRNA SPEC QL NAA+PROBE: SIGNIFICANT CHANGE UP
SPECIMEN SOURCE: SIGNIFICANT CHANGE UP

## 2025-08-16 LAB — HPV HIGH+LOW RISK DNA PNL CVX: SIGNIFICANT CHANGE UP

## 2025-08-17 LAB — CYTOLOGY SPEC DOC CYTO: SIGNIFICANT CHANGE UP

## 2025-08-20 ENCOUNTER — NON-APPOINTMENT (OUTPATIENT)
Age: 35
End: 2025-08-20

## 2025-08-20 RX ORDER — METRONIDAZOLE 7.5 MG/G
0.75 GEL VAGINAL
Qty: 1 | Refills: 0 | Status: ACTIVE | COMMUNITY
Start: 2025-08-20 | End: 1900-01-01

## 2025-09-18 ENCOUNTER — NON-APPOINTMENT (OUTPATIENT)
Age: 35
End: 2025-09-18